# Patient Record
Sex: FEMALE | Race: WHITE | NOT HISPANIC OR LATINO | Employment: FULL TIME | URBAN - METROPOLITAN AREA
[De-identification: names, ages, dates, MRNs, and addresses within clinical notes are randomized per-mention and may not be internally consistent; named-entity substitution may affect disease eponyms.]

---

## 2017-08-29 ENCOUNTER — HOSPITAL ENCOUNTER (OUTPATIENT)
Facility: MEDICAL CENTER | Age: 27
End: 2017-08-29
Attending: OBSTETRICS & GYNECOLOGY
Payer: COMMERCIAL

## 2017-08-29 PROCEDURE — 87591 N.GONORRHOEAE DNA AMP PROB: CPT

## 2017-08-29 PROCEDURE — 88175 CYTOPATH C/V AUTO FLUID REDO: CPT

## 2017-08-29 PROCEDURE — 87491 CHLMYD TRACH DNA AMP PROBE: CPT

## 2017-08-30 LAB — CYTOLOGY REG CYTOL: NORMAL

## 2017-08-31 LAB
C TRACH DNA GENITAL QL NAA+PROBE: NEGATIVE
N GONORRHOEA DNA GENITAL QL NAA+PROBE: NEGATIVE
SPECIMEN SOURCE: NORMAL

## 2017-09-22 ENCOUNTER — HOSPITAL ENCOUNTER (OUTPATIENT)
Facility: MEDICAL CENTER | Age: 27
End: 2017-09-22
Attending: NURSE PRACTITIONER
Payer: COMMERCIAL

## 2017-09-22 ENCOUNTER — OFFICE VISIT (OUTPATIENT)
Dept: URGENT CARE | Facility: PHYSICIAN GROUP | Age: 27
End: 2017-09-22
Payer: COMMERCIAL

## 2017-09-22 VITALS
WEIGHT: 155 LBS | OXYGEN SATURATION: 97 % | SYSTOLIC BLOOD PRESSURE: 126 MMHG | BODY MASS INDEX: 27.46 KG/M2 | TEMPERATURE: 97.5 F | DIASTOLIC BLOOD PRESSURE: 80 MMHG | RESPIRATION RATE: 16 BRPM | HEIGHT: 63 IN | HEART RATE: 86 BPM

## 2017-09-22 DIAGNOSIS — R35.0 URINARY FREQUENCY: ICD-10-CM

## 2017-09-22 DIAGNOSIS — N39.0 URINARY TRACT INFECTION WITH HEMATURIA, SITE UNSPECIFIED: ICD-10-CM

## 2017-09-22 DIAGNOSIS — R31.9 URINARY TRACT INFECTION WITH HEMATURIA, SITE UNSPECIFIED: ICD-10-CM

## 2017-09-22 LAB
APPEARANCE UR: NORMAL
BILIRUB UR STRIP-MCNC: NEGATIVE MG/DL
COLOR UR AUTO: YELLOW
GLUCOSE UR STRIP.AUTO-MCNC: NEGATIVE MG/DL
INT CON NEG: NEGATIVE
INT CON POS: POSITIVE
KETONES UR STRIP.AUTO-MCNC: NEGATIVE MG/DL
LEUKOCYTE ESTERASE UR QL STRIP.AUTO: NORMAL
NITRITE UR QL STRIP.AUTO: NEGATIVE
PH UR STRIP.AUTO: 6 [PH] (ref 5–8)
POC URINE PREGNANCY TEST: NEGATIVE
PROT UR QL STRIP: NORMAL MG/DL
RBC UR QL AUTO: NORMAL
SP GR UR STRIP.AUTO: 1.01
UROBILINOGEN UR STRIP-MCNC: NEGATIVE MG/DL

## 2017-09-22 PROCEDURE — 81025 URINE PREGNANCY TEST: CPT | Performed by: NURSE PRACTITIONER

## 2017-09-22 PROCEDURE — 99000 SPECIMEN HANDLING OFFICE-LAB: CPT | Performed by: NURSE PRACTITIONER

## 2017-09-22 PROCEDURE — 87086 URINE CULTURE/COLONY COUNT: CPT

## 2017-09-22 PROCEDURE — 87077 CULTURE AEROBIC IDENTIFY: CPT

## 2017-09-22 PROCEDURE — 99204 OFFICE O/P NEW MOD 45 MIN: CPT | Performed by: NURSE PRACTITIONER

## 2017-09-22 PROCEDURE — 87186 SC STD MICRODIL/AGAR DIL: CPT

## 2017-09-22 PROCEDURE — 81002 URINALYSIS NONAUTO W/O SCOPE: CPT | Performed by: NURSE PRACTITIONER

## 2017-09-22 RX ORDER — MEDROXYPROGESTERONE ACETATE 150 MG/ML
150 INJECTION, SUSPENSION INTRAMUSCULAR ONCE
Status: ON HOLD | COMMUNITY
End: 2023-12-06

## 2017-09-22 RX ORDER — NITROFURANTOIN 25; 75 MG/1; MG/1
100 CAPSULE ORAL EVERY 12 HOURS
Qty: 14 CAP | Refills: 0 | Status: SHIPPED | OUTPATIENT
Start: 2017-09-22 | End: 2017-09-29

## 2017-09-22 ASSESSMENT — ENCOUNTER SYMPTOMS
FEVER: 0
NAUSEA: 0
WEAKNESS: 0
HEADACHES: 0
MYALGIAS: 1
DIARRHEA: 0
BACK PAIN: 1
VOMITING: 0
CHILLS: 0
DIZZINESS: 0
FLANK PAIN: 0
CONSTIPATION: 0
ABDOMINAL PAIN: 0

## 2017-09-22 NOTE — PROGRESS NOTES
"Subjective:      Thong Soto is a 27 y.o. female who presents with Urinary Frequency (Low back pain X  4days )            HPI  Thong is a female who is here for urinary frequency x 4days. States has had bilat low back pain without efver, n/v, low pelvic pressure, vaginal irritation, diarrhea, vaginal d/c. Has IUD. No recent vaginal spotting.    PMH:  has no past medical history on file.  MEDS:   Current Outpatient Prescriptions:   •  medroxyPROGESTERone (DEPO-PROVERA) 150 MG/ML Suspension, 150 mg by Intramuscular route Once., Disp: , Rfl:   •  nitrofurantoin monohydr macro (MACROBID) 100 MG Cap, Take 1 Cap by mouth every 12 hours for 7 days., Disp: 14 Cap, Rfl: 0  •  Pseudoeph-Doxylamine-DM-APAP (NYQUIL PO), Take  by mouth., Disp: , Rfl:   •  Pseudoephedrine-APAP-DM (DAYQUIL PO), Take  by mouth., Disp: , Rfl:   ALLERGIES: No Known Allergies  SURGHX: No past surgical history on file.  SOCHX:  reports that she has never smoked. She has never used smokeless tobacco. She reports that she drinks alcohol. She reports that she does not use drugs.  FH: Family history was reviewed, no pertinent findings to report    Review of Systems   Constitutional: Negative for chills, fever and malaise/fatigue.   Gastrointestinal: Negative for abdominal pain, constipation, diarrhea, nausea and vomiting.   Genitourinary: Positive for frequency and urgency. Negative for dysuria, flank pain and hematuria.   Musculoskeletal: Positive for back pain and myalgias.   Neurological: Negative for dizziness, weakness and headaches.   All other systems reviewed and are negative.         Objective:     /80   Pulse 86   Temp 36.4 °C (97.5 °F)   Resp 16   Ht 1.6 m (5' 3\")   Wt 70.3 kg (155 lb)   SpO2 97%   BMI 27.46 kg/m²      Physical Exam   Constitutional: She is oriented to person, place, and time. Vital signs are normal. She appears well-developed and well-nourished. She is active and cooperative.  Non-toxic appearance. She does not " have a sickly appearance. She does not appear ill. No distress.   HENT:   Head: Normocephalic.   Eyes: Conjunctivae and EOM are normal. Pupils are equal, round, and reactive to light.   Neck: Normal range of motion. Neck supple.   Cardiovascular: Normal rate and regular rhythm.    Pulmonary/Chest: Effort normal and breath sounds normal.   Abdominal: Soft. Bowel sounds are normal. She exhibits no distension. There is no tenderness. There is no rigidity, no rebound, no guarding and no CVA tenderness.   Neurological: She is alert and oriented to person, place, and time.   Skin: Skin is warm and dry. She is not diaphoretic.   Vitals reviewed.              Assessment/Plan:     1. Urinary frequency    - POCT PREGNANCY: NEG  - POCT Urinalysis    2. Urinary tract infection with hematuria, site unspecified    - nitrofurantoin monohydr macro (MACROBID) 100 MG Cap; Take 1 Cap by mouth every 12 hours for 7 days.  Dispense: 14 Cap; Refill: 0    Increase water intake  Urinate more frequently and empty bladder completely  Practice good toileting hygiene after bowel movements and sexual intercourse, refrain from sexual intercourse until infection cleared  Monitor for back/flank pain, difficulty urinating, blood in urine- need re-evaluation

## 2017-09-24 LAB
BACTERIA UR CULT: ABNORMAL
SIGNIFICANT IND 70042: ABNORMAL
SITE SITE: ABNORMAL
SOURCE SOURCE: ABNORMAL

## 2021-07-13 ENCOUNTER — OFFICE VISIT (OUTPATIENT)
Dept: URGENT CARE | Facility: PHYSICIAN GROUP | Age: 31
End: 2021-07-13
Payer: COMMERCIAL

## 2021-07-13 VITALS
OXYGEN SATURATION: 99 % | TEMPERATURE: 98.5 F | BODY MASS INDEX: 26.58 KG/M2 | SYSTOLIC BLOOD PRESSURE: 100 MMHG | HEIGHT: 63 IN | HEART RATE: 77 BPM | DIASTOLIC BLOOD PRESSURE: 62 MMHG | WEIGHT: 150 LBS

## 2021-07-13 DIAGNOSIS — H60.503 ACUTE OTITIS EXTERNA OF BOTH EARS, UNSPECIFIED TYPE: ICD-10-CM

## 2021-07-13 DIAGNOSIS — H93.8X3 IRRITATION OF EAR, BILATERAL: ICD-10-CM

## 2021-07-13 PROCEDURE — 99202 OFFICE O/P NEW SF 15 MIN: CPT | Performed by: NURSE PRACTITIONER

## 2021-07-13 NOTE — PROGRESS NOTES
Subjective:      Thong Soto is a 31 y.o. female who presents with Otalgia (bnoth ears are painful, itchty, crusty comes out, has fluid coming out , x 3 weeks)            Otalgia   There is pain in both ears. This is a new problem. Episode onset: pt reports new onset of bilateral ear itching, pressure, drainage that started 1-2 months ago. Reports crusty drainage to both ears in the morning. Notes some moisture in ear canals. The problem has been unchanged. Associated symptoms include ear discharge. She has tried nothing for the symptoms. There is no history of a chronic ear infection.       Review of Systems   HENT: Positive for ear discharge and ear pain.    All other systems reviewed and are negative.    History reviewed. No pertinent past medical history. History reviewed. No pertinent surgical history.   Social History     Socioeconomic History   • Marital status: Single     Spouse name: Not on file   • Number of children: Not on file   • Years of education: Not on file   • Highest education level: Not on file   Occupational History   • Not on file   Tobacco Use   • Smoking status: Never Smoker   • Smokeless tobacco: Never Used   Substance and Sexual Activity   • Alcohol use: Yes     Comment: occ   • Drug use: No   • Sexual activity: Yes     Partners: Male   Other Topics Concern   • Not on file   Social History Narrative   • Not on file     Social Determinants of Health     Financial Resource Strain:    • Difficulty of Paying Living Expenses:    Food Insecurity:    • Worried About Running Out of Food in the Last Year:    • Ran Out of Food in the Last Year:    Transportation Needs:    • Lack of Transportation (Medical):    • Lack of Transportation (Non-Medical):    Physical Activity:    • Days of Exercise per Week:    • Minutes of Exercise per Session:    Stress:    • Feeling of Stress :    Social Connections:    • Frequency of Communication with Friends and Family:    • Frequency of Social Gatherings with  "Friends and Family:    • Attends Orthodoxy Services:    • Active Member of Clubs or Organizations:    • Attends Club or Organization Meetings:    • Marital Status:    Intimate Partner Violence:    • Fear of Current or Ex-Partner:    • Emotionally Abused:    • Physically Abused:    • Sexually Abused:           Objective:     /62 (BP Location: Right arm, Patient Position: Sitting, BP Cuff Size: Adult)   Pulse 77   Temp 36.9 °C (98.5 °F)   Ht 1.6 m (5' 3\")   Wt 68 kg (150 lb)   SpO2 99%   BMI 26.57 kg/m²      Physical Exam  Vitals and nursing note reviewed.   Constitutional:       Appearance: Normal appearance. She is normal weight.   HENT:      Head: Normocephalic and atraumatic.      Right Ear: Tympanic membrane normal. Tenderness (tragus) present. No swelling.      Left Ear: Tympanic membrane normal. Tenderness (tragus) present. No swelling.      Ears:        Nose: Nose normal.      Mouth/Throat:      Mouth: Mucous membranes are moist.      Pharynx: Oropharynx is clear.   Eyes:      Extraocular Movements: Extraocular movements intact.      Pupils: Pupils are equal, round, and reactive to light.   Cardiovascular:      Rate and Rhythm: Normal rate and regular rhythm.   Pulmonary:      Effort: Pulmonary effort is normal.   Musculoskeletal:         General: Normal range of motion.      Cervical back: Normal range of motion.   Skin:     General: Skin is warm and dry.      Capillary Refill: Capillary refill takes less than 2 seconds.   Neurological:      General: No focal deficit present.      Mental Status: She is alert and oriented to person, place, and time. Mental status is at baseline.   Psychiatric:         Mood and Affect: Mood normal.         Speech: Speech normal.         Thought Content: Thought content normal.         Judgment: Judgment normal.                        Assessment/Plan:        1. Irritation of ear, bilateral    2. Acute otitis externa of both ears, unspecified type  - neomycin " sulf/polymyx B sulf/HC soln (CORTISPORIN HC SOL) [id number] Solution; Administer 3 Drops into affected ear(s) 4 times a day for 7 days.  Dispense: 10 mL; Refill: 0    Discussed with patient to keep her ear canal clean and dry as best as possible  Use otic drops as directed  Use hair dryer on low setting to help dry ear canal  Baby soap and soft wash cloth to clean away any debris from ear canal  May consider ENT referral if it does not improve  Supportive care, differential diagnoses, and indications for immediate follow-up discussed with patient.    Pathogenesis of diagnosis discussed including typical length and natural progression.      Instructed to return to  or nearest emergency department if symptoms fail to improve, for any change in condition, further concerns, or new concerning symptoms.  Patient states understanding of the plan of care and discharge instructions.

## 2023-05-05 ENCOUNTER — HOSPITAL ENCOUNTER (OUTPATIENT)
Facility: MEDICAL CENTER | Age: 33
End: 2023-05-05
Attending: OBSTETRICS & GYNECOLOGY
Payer: COMMERCIAL

## 2023-05-05 LAB
APPEARANCE UR: CLEAR
BILIRUB UR QL STRIP.AUTO: NEGATIVE
COLOR UR: YELLOW
GLUCOSE UR STRIP.AUTO-MCNC: NEGATIVE MG/DL
KETONES UR STRIP.AUTO-MCNC: NEGATIVE MG/DL
LEUKOCYTE ESTERASE UR QL STRIP.AUTO: NEGATIVE
MICRO URNS: NORMAL
NITRITE UR QL STRIP.AUTO: NEGATIVE
PH UR STRIP.AUTO: 6.5 [PH] (ref 5–8)
PROT UR QL STRIP: NEGATIVE MG/DL
RBC UR QL AUTO: NEGATIVE
SP GR UR STRIP.AUTO: 1
UROBILINOGEN UR STRIP.AUTO-MCNC: 0.2 MG/DL

## 2023-05-05 PROCEDURE — 81003 URINALYSIS AUTO W/O SCOPE: CPT

## 2023-05-05 PROCEDURE — 87389 HIV-1 AG W/HIV-1&-2 AB AG IA: CPT

## 2023-05-05 PROCEDURE — 86780 TREPONEMA PALLIDUM: CPT

## 2023-05-05 PROCEDURE — 87340 HEPATITIS B SURFACE AG IA: CPT

## 2023-05-05 PROCEDURE — 86803 HEPATITIS C AB TEST: CPT

## 2023-05-05 PROCEDURE — 87086 URINE CULTURE/COLONY COUNT: CPT

## 2023-05-05 PROCEDURE — 86901 BLOOD TYPING SEROLOGIC RH(D): CPT

## 2023-05-05 PROCEDURE — 86850 RBC ANTIBODY SCREEN: CPT

## 2023-05-05 PROCEDURE — 87491 CHLMYD TRACH DNA AMP PROBE: CPT

## 2023-05-05 PROCEDURE — 86900 BLOOD TYPING SEROLOGIC ABO: CPT

## 2023-05-05 PROCEDURE — 85025 COMPLETE CBC W/AUTO DIFF WBC: CPT

## 2023-05-05 PROCEDURE — 87591 N.GONORRHOEAE DNA AMP PROB: CPT

## 2023-05-05 PROCEDURE — 86762 RUBELLA ANTIBODY: CPT

## 2023-05-06 LAB
ABO GROUP BLD: NORMAL
BASOPHILS # BLD AUTO: 0.5 % (ref 0–1.8)
BASOPHILS # BLD: 0.05 K/UL (ref 0–0.12)
BLD GP AB SCN SERPL QL: NORMAL
C TRACH DNA SPEC QL NAA+PROBE: NEGATIVE
EOSINOPHIL # BLD AUTO: 0.03 K/UL (ref 0–0.51)
EOSINOPHIL NFR BLD: 0.3 % (ref 0–6.9)
ERYTHROCYTE [DISTWIDTH] IN BLOOD BY AUTOMATED COUNT: 41.6 FL (ref 35.9–50)
HBV SURFACE AG SER QL: ABNORMAL
HCT VFR BLD AUTO: 46.4 % (ref 37–47)
HCV AB SER QL: NORMAL
HGB BLD-MCNC: 15.6 G/DL (ref 12–16)
HIV 1+2 AB+HIV1 P24 AG SERPL QL IA: NORMAL
IMM GRANULOCYTES # BLD AUTO: 0.04 K/UL (ref 0–0.11)
IMM GRANULOCYTES NFR BLD AUTO: 0.4 % (ref 0–0.9)
LYMPHOCYTES # BLD AUTO: 1.4 K/UL (ref 1–4.8)
LYMPHOCYTES NFR BLD: 13.6 % (ref 22–41)
MCH RBC QN AUTO: 31.5 PG (ref 27–33)
MCHC RBC AUTO-ENTMCNC: 33.6 G/DL (ref 33.6–35)
MCV RBC AUTO: 93.7 FL (ref 81.4–97.8)
MONOCYTES # BLD AUTO: 0.5 K/UL (ref 0–0.85)
MONOCYTES NFR BLD AUTO: 4.9 % (ref 0–13.4)
N GONORRHOEA DNA SPEC QL NAA+PROBE: NEGATIVE
NEUTROPHILS # BLD AUTO: 8.28 K/UL (ref 2–7.15)
NEUTROPHILS NFR BLD: 80.3 % (ref 44–72)
NRBC # BLD AUTO: 0 K/UL
NRBC BLD-RTO: 0 /100 WBC
PLATELET # BLD AUTO: 211 K/UL (ref 164–446)
PMV BLD AUTO: 12.7 FL (ref 9–12.9)
RBC # BLD AUTO: 4.95 M/UL (ref 4.2–5.4)
RH BLD: NORMAL
RUBV AB SER QL: 9.15 IU/ML
SPECIMEN SOURCE: NORMAL
T PALLIDUM AB SER QL IA: ABNORMAL
WBC # BLD AUTO: 10.3 K/UL (ref 4.8–10.8)

## 2023-05-09 LAB
BACTERIA UR CULT: NORMAL
SIGNIFICANT IND 70042: NORMAL
SITE SITE: NORMAL
SOURCE SOURCE: NORMAL

## 2023-07-11 ENCOUNTER — OFFICE VISIT (OUTPATIENT)
Dept: OBGYN | Facility: CLINIC | Age: 33
End: 2023-07-11
Payer: COMMERCIAL

## 2023-07-11 VITALS — SYSTOLIC BLOOD PRESSURE: 130 MMHG | WEIGHT: 172.3 LBS | DIASTOLIC BLOOD PRESSURE: 71 MMHG | BODY MASS INDEX: 30.52 KG/M2

## 2023-07-11 DIAGNOSIS — Z36.3 ANTENATAL SCREENING FOR MALFORMATION USING ULTRASONICS: ICD-10-CM

## 2023-07-11 PROCEDURE — 3075F SYST BP GE 130 - 139MM HG: CPT | Performed by: OBSTETRICS & GYNECOLOGY

## 2023-07-11 PROCEDURE — 3078F DIAST BP <80 MM HG: CPT | Performed by: OBSTETRICS & GYNECOLOGY

## 2023-07-11 PROCEDURE — 76805 OB US >/= 14 WKS SNGL FETUS: CPT | Performed by: OBSTETRICS & GYNECOLOGY

## 2023-11-27 ENCOUNTER — HOSPITAL ENCOUNTER (EMERGENCY)
Facility: MEDICAL CENTER | Age: 33
End: 2023-11-27
Attending: OBSTETRICS & GYNECOLOGY | Admitting: OBSTETRICS & GYNECOLOGY
Payer: COMMERCIAL

## 2023-11-27 VITALS
OXYGEN SATURATION: 98 % | SYSTOLIC BLOOD PRESSURE: 139 MMHG | HEART RATE: 88 BPM | HEIGHT: 63 IN | DIASTOLIC BLOOD PRESSURE: 79 MMHG | WEIGHT: 193 LBS | TEMPERATURE: 97 F | BODY MASS INDEX: 34.2 KG/M2

## 2023-11-27 LAB
ALBUMIN SERPL BCP-MCNC: 3.2 G/DL (ref 3.2–4.9)
ALBUMIN/GLOB SERPL: 1 G/DL
ALP SERPL-CCNC: 128 U/L (ref 30–99)
ALT SERPL-CCNC: 11 U/L (ref 2–50)
ANION GAP SERPL CALC-SCNC: 13 MMOL/L (ref 7–16)
AST SERPL-CCNC: 12 U/L (ref 12–45)
BASOPHILS # BLD AUTO: 0.4 % (ref 0–1.8)
BASOPHILS # BLD: 0.04 K/UL (ref 0–0.12)
BILIRUB SERPL-MCNC: 0.3 MG/DL (ref 0.1–1.5)
BUN SERPL-MCNC: 8 MG/DL (ref 8–22)
CALCIUM ALBUM COR SERPL-MCNC: 9.7 MG/DL (ref 8.5–10.5)
CALCIUM SERPL-MCNC: 9.1 MG/DL (ref 8.5–10.5)
CHLORIDE SERPL-SCNC: 105 MMOL/L (ref 96–112)
CO2 SERPL-SCNC: 19 MMOL/L (ref 20–33)
CREAT SERPL-MCNC: 0.75 MG/DL (ref 0.5–1.4)
CREAT UR-MCNC: 32.14 MG/DL
CREAT UR-MCNC: 32.63 MG/DL
EOSINOPHIL # BLD AUTO: 0.02 K/UL (ref 0–0.51)
EOSINOPHIL NFR BLD: 0.2 % (ref 0–6.9)
ERYTHROCYTE [DISTWIDTH] IN BLOOD BY AUTOMATED COUNT: 40.5 FL (ref 35.9–50)
GFR SERPLBLD CREATININE-BSD FMLA CKD-EPI: 107 ML/MIN/1.73 M 2
GLOBULIN SER CALC-MCNC: 3.3 G/DL (ref 1.9–3.5)
GLUCOSE SERPL-MCNC: 67 MG/DL (ref 65–99)
HCT VFR BLD AUTO: 37.7 % (ref 37–47)
HGB BLD-MCNC: 13 G/DL (ref 12–16)
IMM GRANULOCYTES # BLD AUTO: 0.06 K/UL (ref 0–0.11)
IMM GRANULOCYTES NFR BLD AUTO: 0.6 % (ref 0–0.9)
LYMPHOCYTES # BLD AUTO: 1.49 K/UL (ref 1–4.8)
LYMPHOCYTES NFR BLD: 13.8 % (ref 22–41)
MCH RBC QN AUTO: 31.2 PG (ref 27–33)
MCHC RBC AUTO-ENTMCNC: 34.5 G/DL (ref 32.2–35.5)
MCV RBC AUTO: 90.4 FL (ref 81.4–97.8)
MONOCYTES # BLD AUTO: 0.59 K/UL (ref 0–0.85)
MONOCYTES NFR BLD AUTO: 5.5 % (ref 0–13.4)
NEUTROPHILS # BLD AUTO: 8.56 K/UL (ref 1.82–7.42)
NEUTROPHILS NFR BLD: 79.5 % (ref 44–72)
NRBC # BLD AUTO: 0 K/UL
NRBC BLD-RTO: 0 /100 WBC (ref 0–0.2)
PLATELET # BLD AUTO: 132 K/UL (ref 164–446)
PMV BLD AUTO: 13.4 FL (ref 9–12.9)
POTASSIUM SERPL-SCNC: 3.8 MMOL/L (ref 3.6–5.5)
PROT SERPL-MCNC: 6.5 G/DL (ref 6–8.2)
PROT UR-MCNC: 4 MG/DL (ref 0–15)
PROT UR-MCNC: 7 MG/DL (ref 0–15)
PROT/CREAT UR: 124 MG/G (ref 10–107)
PROT/CREAT UR: 215 MG/G (ref 10–107)
RBC # BLD AUTO: 4.17 M/UL (ref 4.2–5.4)
SODIUM SERPL-SCNC: 137 MMOL/L (ref 135–145)
URATE SERPL-MCNC: 5.7 MG/DL (ref 1.9–8.2)
WBC # BLD AUTO: 10.8 K/UL (ref 4.8–10.8)

## 2023-11-27 PROCEDURE — 36415 COLL VENOUS BLD VENIPUNCTURE: CPT

## 2023-11-27 PROCEDURE — 85025 COMPLETE CBC W/AUTO DIFF WBC: CPT

## 2023-11-27 PROCEDURE — 59025 FETAL NON-STRESS TEST: CPT

## 2023-11-27 PROCEDURE — 84156 ASSAY OF PROTEIN URINE: CPT

## 2023-11-27 PROCEDURE — 84550 ASSAY OF BLOOD/URIC ACID: CPT

## 2023-11-27 PROCEDURE — 82570 ASSAY OF URINE CREATININE: CPT | Mod: 91

## 2023-11-27 PROCEDURE — 80053 COMPREHEN METABOLIC PANEL: CPT

## 2023-11-27 PROCEDURE — 302449 STATCHG TRIAGE ONLY (STATISTIC)

## 2023-11-27 NOTE — PROGRESS NOTES
33 y.o  presenting for PIH work-up from [facility] office. States mild cramping sometimes, +FM, denies VB and LOF.     1345 EFM and TOCO placed. VSS.

## 2023-11-28 NOTE — PROGRESS NOTES
EDC -  EGA - 39-4    1320 - Pt arrived to labor and delivery for high BPs at home. Pt placed in room LDA5.  1345 - External monitors in place X2. Category I FHT at this time. VSS. Pt reports good FM. No complaints of contractions, ROM or vaginal bleeding. FOB at bedside. POC discussed with pt and family members, all questions answered. Lab orders placed.   - Dr Aleman updated on pt status including delay in results. Orders for SVE.   1800 - Dr Aleman updated on pt results, BPs, and SVE. DC orders received.    - DC instructions reviewed with pt and FOB. Instructed to come back with s/s of pre-e, all included in DC paperwork, labor DC instructions reviewed along with kick count paperwork. Pt to keep appointments with Dr Aleman this week. Pt and FOB ambulated off unit in stable condition with belongings in hand.

## 2023-12-02 ENCOUNTER — APPOINTMENT (OUTPATIENT)
Dept: OBGYN | Facility: MEDICAL CENTER | Age: 33
End: 2023-12-02
Attending: OBSTETRICS & GYNECOLOGY
Payer: COMMERCIAL

## 2023-12-02 ENCOUNTER — ANESTHESIA EVENT (OUTPATIENT)
Dept: OBGYN | Facility: MEDICAL CENTER | Age: 33
End: 2023-12-02
Payer: COMMERCIAL

## 2023-12-02 ENCOUNTER — HOSPITAL ENCOUNTER (INPATIENT)
Facility: MEDICAL CENTER | Age: 33
LOS: 4 days | End: 2023-12-06
Attending: OBSTETRICS & GYNECOLOGY | Admitting: OBSTETRICS & GYNECOLOGY
Payer: COMMERCIAL

## 2023-12-02 ENCOUNTER — ANESTHESIA (OUTPATIENT)
Dept: OBGYN | Facility: MEDICAL CENTER | Age: 33
End: 2023-12-02
Payer: COMMERCIAL

## 2023-12-02 DIAGNOSIS — G89.18 POSTOPERATIVE PAIN: ICD-10-CM

## 2023-12-02 LAB
BASOPHILS # BLD AUTO: 0.4 % (ref 0–1.8)
BASOPHILS # BLD: 0.04 K/UL (ref 0–0.12)
EOSINOPHIL # BLD AUTO: 0.03 K/UL (ref 0–0.51)
EOSINOPHIL NFR BLD: 0.3 % (ref 0–6.9)
ERYTHROCYTE [DISTWIDTH] IN BLOOD BY AUTOMATED COUNT: 41.1 FL (ref 35.9–50)
HCT VFR BLD AUTO: 36.2 % (ref 37–47)
HGB BLD-MCNC: 12.8 G/DL (ref 12–16)
HOLDING TUBE BB 8507: NORMAL
IMM GRANULOCYTES # BLD AUTO: 0.07 K/UL (ref 0–0.11)
IMM GRANULOCYTES NFR BLD AUTO: 0.7 % (ref 0–0.9)
LYMPHOCYTES # BLD AUTO: 1.4 K/UL (ref 1–4.8)
LYMPHOCYTES NFR BLD: 13.1 % (ref 22–41)
MCH RBC QN AUTO: 31.5 PG (ref 27–33)
MCHC RBC AUTO-ENTMCNC: 35.4 G/DL (ref 32.2–35.5)
MCV RBC AUTO: 89.2 FL (ref 81.4–97.8)
MONOCYTES # BLD AUTO: 0.49 K/UL (ref 0–0.85)
MONOCYTES NFR BLD AUTO: 4.6 % (ref 0–13.4)
NEUTROPHILS # BLD AUTO: 8.63 K/UL (ref 1.82–7.42)
NEUTROPHILS NFR BLD: 80.9 % (ref 44–72)
NRBC # BLD AUTO: 0 K/UL
NRBC BLD-RTO: 0 /100 WBC (ref 0–0.2)
PLATELET # BLD AUTO: 135 K/UL (ref 164–446)
PMV BLD AUTO: 13.2 FL (ref 9–12.9)
RBC # BLD AUTO: 4.06 M/UL (ref 4.2–5.4)
T PALLIDUM AB SER QL IA: NORMAL
WBC # BLD AUTO: 10.7 K/UL (ref 4.8–10.8)

## 2023-12-02 PROCEDURE — 85025 COMPLETE CBC W/AUTO DIFF WBC: CPT

## 2023-12-02 PROCEDURE — 303615 HCHG EPIDURAL/SPINAL ANESTHESIA FOR LABOR

## 2023-12-02 PROCEDURE — 700111 HCHG RX REV CODE 636 W/ 250 OVERRIDE (IP): Mod: JZ | Performed by: OBSTETRICS & GYNECOLOGY

## 2023-12-02 PROCEDURE — 700111 HCHG RX REV CODE 636 W/ 250 OVERRIDE (IP): Mod: JZ | Performed by: ANESTHESIOLOGY

## 2023-12-02 PROCEDURE — 770002 HCHG ROOM/CARE - OB PRIVATE (112)

## 2023-12-02 PROCEDURE — 700105 HCHG RX REV CODE 258: Performed by: ANESTHESIOLOGY

## 2023-12-02 PROCEDURE — 700105 HCHG RX REV CODE 258: Performed by: OBSTETRICS & GYNECOLOGY

## 2023-12-02 PROCEDURE — 700111 HCHG RX REV CODE 636 W/ 250 OVERRIDE (IP)

## 2023-12-02 PROCEDURE — 700101 HCHG RX REV CODE 250: Performed by: OBSTETRICS & GYNECOLOGY

## 2023-12-02 PROCEDURE — 36415 COLL VENOUS BLD VENIPUNCTURE: CPT

## 2023-12-02 PROCEDURE — 86780 TREPONEMA PALLIDUM: CPT

## 2023-12-02 RX ORDER — LIDOCAINE HYDROCHLORIDE 10 MG/ML
20 INJECTION, SOLUTION INFILTRATION; PERINEURAL
Status: DISCONTINUED | OUTPATIENT
Start: 2023-12-02 | End: 2023-12-03 | Stop reason: HOSPADM

## 2023-12-02 RX ORDER — SODIUM CHLORIDE, SODIUM LACTATE, POTASSIUM CHLORIDE, CALCIUM CHLORIDE 600; 310; 30; 20 MG/100ML; MG/100ML; MG/100ML; MG/100ML
INJECTION, SOLUTION INTRAVENOUS CONTINUOUS
Status: DISCONTINUED | OUTPATIENT
Start: 2023-12-02 | End: 2023-12-05 | Stop reason: HOSPADM

## 2023-12-02 RX ORDER — BUPIVACAINE HYDROCHLORIDE 2.5 MG/ML
INJECTION, SOLUTION EPIDURAL; INFILTRATION; INTRACAUDAL PRN
Status: DISCONTINUED | OUTPATIENT
Start: 2023-12-02 | End: 2023-12-03 | Stop reason: SURG

## 2023-12-02 RX ORDER — OXYTOCIN 10 [USP'U]/ML
10 INJECTION, SOLUTION INTRAMUSCULAR; INTRAVENOUS
Status: DISCONTINUED | OUTPATIENT
Start: 2023-12-02 | End: 2023-12-03 | Stop reason: HOSPADM

## 2023-12-02 RX ORDER — IBUPROFEN 800 MG/1
800 TABLET ORAL
Status: DISCONTINUED | OUTPATIENT
Start: 2023-12-02 | End: 2023-12-03 | Stop reason: HOSPADM

## 2023-12-02 RX ORDER — BUPIVACAINE HYDROCHLORIDE 2.5 MG/ML
INJECTION, SOLUTION EPIDURAL; INFILTRATION; INTRACAUDAL
Status: COMPLETED
Start: 2023-12-02 | End: 2023-12-02

## 2023-12-02 RX ORDER — CARBOPROST TROMETHAMINE 250 UG/ML
250 INJECTION, SOLUTION INTRAMUSCULAR
Status: DISCONTINUED | OUTPATIENT
Start: 2023-12-02 | End: 2023-12-03 | Stop reason: HOSPADM

## 2023-12-02 RX ORDER — TERBUTALINE SULFATE 1 MG/ML
0.25 INJECTION, SOLUTION SUBCUTANEOUS
Status: DISCONTINUED | OUTPATIENT
Start: 2023-12-02 | End: 2023-12-03 | Stop reason: HOSPADM

## 2023-12-02 RX ORDER — ROPIVACAINE HYDROCHLORIDE 2 MG/ML
INJECTION, SOLUTION EPIDURAL; INFILTRATION; PERINEURAL
Status: COMPLETED
Start: 2023-12-02 | End: 2023-12-02

## 2023-12-02 RX ORDER — MISOPROSTOL 200 UG/1
800 TABLET ORAL
Status: DISCONTINUED | OUTPATIENT
Start: 2023-12-02 | End: 2023-12-03 | Stop reason: HOSPADM

## 2023-12-02 RX ORDER — ROPIVACAINE HYDROCHLORIDE 2 MG/ML
INJECTION, SOLUTION EPIDURAL; INFILTRATION
Status: DISCONTINUED | OUTPATIENT
Start: 2023-12-02 | End: 2023-12-03 | Stop reason: SURG

## 2023-12-02 RX ORDER — ACETAMINOPHEN 500 MG
1000 TABLET ORAL
Status: COMPLETED | OUTPATIENT
Start: 2023-12-02 | End: 2023-12-03

## 2023-12-02 RX ADMIN — SODIUM CHLORIDE, POTASSIUM CHLORIDE, SODIUM LACTATE AND CALCIUM CHLORIDE: 600; 310; 30; 20 INJECTION, SOLUTION INTRAVENOUS at 12:00

## 2023-12-02 RX ADMIN — FENTANYL CITRATE 100 MCG: 50 INJECTION, SOLUTION INTRAMUSCULAR; INTRAVENOUS at 21:03

## 2023-12-02 RX ADMIN — SODIUM CHLORIDE, SODIUM GLUCONATE, SODIUM ACETATE, POTASSIUM CHLORIDE AND MAGNESIUM CHLORIDE: 526; 502; 368; 37; 30 INJECTION, SOLUTION INTRAVENOUS at 20:50

## 2023-12-02 RX ADMIN — OXYTOCIN 2 MILLI-UNITS/MIN: 10 INJECTION, SOLUTION INTRAMUSCULAR; INTRAVENOUS at 14:10

## 2023-12-02 RX ADMIN — WATER 2.5 MILLION UNITS: 1 INJECTION INTRAMUSCULAR; INTRAVENOUS; SUBCUTANEOUS at 23:52

## 2023-12-02 RX ADMIN — WATER 2.5 MILLION UNITS: 1 INJECTION INTRAMUSCULAR; INTRAVENOUS; SUBCUTANEOUS at 19:44

## 2023-12-02 RX ADMIN — SODIUM CHLORIDE 5 MILLION UNITS: 900 INJECTION INTRAVENOUS at 12:02

## 2023-12-02 RX ADMIN — ROPIVACAINE HYDROCHLORIDE: 2 INJECTION, SOLUTION EPIDURAL; INFILTRATION at 21:12

## 2023-12-02 RX ADMIN — WATER 2.5 MILLION UNITS: 1 INJECTION INTRAMUSCULAR; INTRAVENOUS; SUBCUTANEOUS at 16:56

## 2023-12-02 RX ADMIN — BUPIVACAINE HYDROCHLORIDE 10 ML: 2.5 INJECTION, SOLUTION EPIDURAL; INFILTRATION; INTRACAUDAL at 21:03

## 2023-12-02 RX ADMIN — ROPIVACAINE HYDROCHLORIDE 10 ML/HR: 5 INJECTION, SOLUTION EPIDURAL; INFILTRATION; PERINEURAL at 21:12

## 2023-12-02 RX ADMIN — ROPIVACAINE HYDROCHLORIDE: 2 INJECTION, SOLUTION EPIDURAL; INFILTRATION; PERINEURAL at 21:12

## 2023-12-02 ASSESSMENT — LIFESTYLE VARIABLES: EVER_SMOKED: NEVER

## 2023-12-02 ASSESSMENT — PATIENT HEALTH QUESTIONNAIRE - PHQ9
SUM OF ALL RESPONSES TO PHQ9 QUESTIONS 1 AND 2: 0
1. LITTLE INTEREST OR PLEASURE IN DOING THINGS: NOT AT ALL
2. FEELING DOWN, DEPRESSED, IRRITABLE, OR HOPELESS: NOT AT ALL
SUM OF ALL RESPONSES TO PHQ9 QUESTIONS 1 AND 2: 0
1. LITTLE INTEREST OR PLEASURE IN DOING THINGS: NOT AT ALL
2. FEELING DOWN, DEPRESSED, IRRITABLE, OR HOPELESS: NOT AT ALL

## 2023-12-02 ASSESSMENT — FIBROSIS 4 INDEX: FIB4 SCORE: .9045340337332908679

## 2023-12-02 NOTE — PROGRESS NOTES
1400- Bedside report received from Bee AGUILAR., induction POC discussed, care assumed. Pt resting comfortably in bed with family at side, pt denies any needs. Call light within reach.     1500- Bedside report given to Olimpia GALVAN, induction POC discussed, care relinquished with pt in stable condition.

## 2023-12-02 NOTE — PROGRESS NOTES
Pt arrived to unit for scheduled IOL. Pt reports +fetal movement and occasional contractions. She denies LOF or vaginal bleeding. Options for induction discussed as well as likely course of care. Pain management options discussed. Pt encouraged to move frequently and communicate with RN for non-medical pain interventions.     1400 Report to JEFF Means. Care relinquished.

## 2023-12-03 LAB
ERYTHROCYTE [DISTWIDTH] IN BLOOD BY AUTOMATED COUNT: 41.5 FL (ref 35.9–50)
HCT VFR BLD AUTO: 31.7 % (ref 37–47)
HGB BLD-MCNC: 10.8 G/DL (ref 12–16)
MCH RBC QN AUTO: 31 PG (ref 27–33)
MCHC RBC AUTO-ENTMCNC: 34.1 G/DL (ref 32.2–35.5)
MCV RBC AUTO: 91.1 FL (ref 81.4–97.8)
PLATELET # BLD AUTO: 115 K/UL (ref 164–446)
PMV BLD AUTO: 13.5 FL (ref 9–12.9)
RBC # BLD AUTO: 3.48 M/UL (ref 4.2–5.4)
WBC # BLD AUTO: 18.1 K/UL (ref 4.8–10.8)

## 2023-12-03 PROCEDURE — 59514 CESAREAN DELIVERY ONLY: CPT | Mod: AS | Performed by: ADVANCED PRACTICE MIDWIFE

## 2023-12-03 PROCEDURE — 770002 HCHG ROOM/CARE - OB PRIVATE (112)

## 2023-12-03 PROCEDURE — 700111 HCHG RX REV CODE 636 W/ 250 OVERRIDE (IP): Performed by: ANESTHESIOLOGY

## 2023-12-03 PROCEDURE — 700102 HCHG RX REV CODE 250 W/ 637 OVERRIDE(OP): Performed by: OBSTETRICS & GYNECOLOGY

## 2023-12-03 PROCEDURE — 160048 HCHG OR STATISTICAL LEVEL 1-5: Performed by: OBSTETRICS & GYNECOLOGY

## 2023-12-03 PROCEDURE — 160035 HCHG PACU - 1ST 60 MINS PHASE I: Performed by: OBSTETRICS & GYNECOLOGY

## 2023-12-03 PROCEDURE — 160029 HCHG SURGERY MINUTES - 1ST 30 MINS LEVEL 4: Performed by: OBSTETRICS & GYNECOLOGY

## 2023-12-03 PROCEDURE — 700111 HCHG RX REV CODE 636 W/ 250 OVERRIDE (IP): Mod: JZ | Performed by: OBSTETRICS & GYNECOLOGY

## 2023-12-03 PROCEDURE — 36415 COLL VENOUS BLD VENIPUNCTURE: CPT

## 2023-12-03 PROCEDURE — 85027 COMPLETE CBC AUTOMATED: CPT

## 2023-12-03 PROCEDURE — 700105 HCHG RX REV CODE 258: Performed by: OBSTETRICS & GYNECOLOGY

## 2023-12-03 PROCEDURE — A9270 NON-COVERED ITEM OR SERVICE: HCPCS | Performed by: OBSTETRICS & GYNECOLOGY

## 2023-12-03 PROCEDURE — 160002 HCHG RECOVERY MINUTES (STAT): Performed by: OBSTETRICS & GYNECOLOGY

## 2023-12-03 PROCEDURE — 160009 HCHG ANES TIME/MIN: Performed by: OBSTETRICS & GYNECOLOGY

## 2023-12-03 PROCEDURE — 700101 HCHG RX REV CODE 250: Performed by: ANESTHESIOLOGY

## 2023-12-03 PROCEDURE — 160041 HCHG SURGERY MINUTES - EA ADDL 1 MIN LEVEL 4: Performed by: OBSTETRICS & GYNECOLOGY

## 2023-12-03 PROCEDURE — 700101 HCHG RX REV CODE 250: Performed by: OBSTETRICS & GYNECOLOGY

## 2023-12-03 PROCEDURE — C1755 CATHETER, INTRASPINAL: HCPCS | Performed by: OBSTETRICS & GYNECOLOGY

## 2023-12-03 RX ORDER — MIDAZOLAM HYDROCHLORIDE 1 MG/ML
1 INJECTION INTRAMUSCULAR; INTRAVENOUS
Status: DISCONTINUED | OUTPATIENT
Start: 2023-12-03 | End: 2023-12-03 | Stop reason: HOSPADM

## 2023-12-03 RX ORDER — CITRIC ACID/SODIUM CITRATE 334-500MG
30 SOLUTION, ORAL ORAL ONCE
Status: COMPLETED | OUTPATIENT
Start: 2023-12-03 | End: 2023-12-03

## 2023-12-03 RX ORDER — IBUPROFEN 800 MG/1
800 TABLET ORAL EVERY 8 HOURS
Status: DISCONTINUED | OUTPATIENT
Start: 2023-12-04 | End: 2023-12-06 | Stop reason: HOSPADM

## 2023-12-03 RX ORDER — OXYCODONE HYDROCHLORIDE 5 MG/1
5 TABLET ORAL EVERY 4 HOURS PRN
Status: ACTIVE | OUTPATIENT
Start: 2023-12-03 | End: 2023-12-04

## 2023-12-03 RX ORDER — SIMETHICONE 125 MG
125 TABLET,CHEWABLE ORAL 4 TIMES DAILY PRN
Status: DISCONTINUED | OUTPATIENT
Start: 2023-12-03 | End: 2023-12-06 | Stop reason: HOSPADM

## 2023-12-03 RX ORDER — ONDANSETRON 4 MG/1
4 TABLET, ORALLY DISINTEGRATING ORAL EVERY 6 HOURS PRN
Status: DISCONTINUED | OUTPATIENT
Start: 2023-12-03 | End: 2023-12-06 | Stop reason: HOSPADM

## 2023-12-03 RX ORDER — CEFAZOLIN SODIUM 1 G/3ML
INJECTION, POWDER, FOR SOLUTION INTRAMUSCULAR; INTRAVENOUS PRN
Status: DISCONTINUED | OUTPATIENT
Start: 2023-12-03 | End: 2023-12-03

## 2023-12-03 RX ORDER — HYDROMORPHONE HYDROCHLORIDE 1 MG/ML
0.4 INJECTION, SOLUTION INTRAMUSCULAR; INTRAVENOUS; SUBCUTANEOUS
Status: ACTIVE | OUTPATIENT
Start: 2023-12-03 | End: 2023-12-04

## 2023-12-03 RX ORDER — DIPHENHYDRAMINE HYDROCHLORIDE 50 MG/ML
12.5 INJECTION INTRAMUSCULAR; INTRAVENOUS EVERY 6 HOURS PRN
Status: ACTIVE | OUTPATIENT
Start: 2023-12-03 | End: 2023-12-04

## 2023-12-03 RX ORDER — CEFAZOLIN SODIUM 1 G/3ML
INJECTION, POWDER, FOR SOLUTION INTRAMUSCULAR; INTRAVENOUS PRN
Status: DISCONTINUED | OUTPATIENT
Start: 2023-12-03 | End: 2023-12-03 | Stop reason: SURG

## 2023-12-03 RX ORDER — KETOROLAC TROMETHAMINE 30 MG/ML
30 INJECTION, SOLUTION INTRAMUSCULAR; INTRAVENOUS EVERY 6 HOURS
Status: COMPLETED | OUTPATIENT
Start: 2023-12-03 | End: 2023-12-04

## 2023-12-03 RX ORDER — ACETAMINOPHEN 500 MG
1000 TABLET ORAL EVERY 6 HOURS PRN
Status: DISCONTINUED | OUTPATIENT
Start: 2023-12-06 | End: 2023-12-06 | Stop reason: HOSPADM

## 2023-12-03 RX ORDER — ONDANSETRON 2 MG/ML
INJECTION INTRAMUSCULAR; INTRAVENOUS
Status: DISPENSED
Start: 2023-12-03 | End: 2023-12-03

## 2023-12-03 RX ORDER — AZITHROMYCIN 500 MG/5ML
500 INJECTION, POWDER, LYOPHILIZED, FOR SOLUTION INTRAVENOUS ONCE
Status: COMPLETED | OUTPATIENT
Start: 2023-12-03 | End: 2023-12-03

## 2023-12-03 RX ORDER — PHYTONADIONE 2 MG/ML
INJECTION, EMULSION INTRAMUSCULAR; INTRAVENOUS; SUBCUTANEOUS
Status: ACTIVE
Start: 2023-12-03 | End: 2023-12-03

## 2023-12-03 RX ORDER — METHYLERGONOVINE MALEATE 0.2 MG/ML
INJECTION INTRAVENOUS
Status: COMPLETED
Start: 2023-12-03 | End: 2023-12-03

## 2023-12-03 RX ORDER — DIPHENHYDRAMINE HCL 25 MG
25 TABLET ORAL EVERY 6 HOURS PRN
Status: DISCONTINUED | OUTPATIENT
Start: 2023-12-03 | End: 2023-12-03

## 2023-12-03 RX ORDER — HALOPERIDOL 5 MG/ML
1 INJECTION INTRAMUSCULAR
Status: DISCONTINUED | OUTPATIENT
Start: 2023-12-03 | End: 2023-12-03 | Stop reason: HOSPADM

## 2023-12-03 RX ORDER — CALCIUM CARBONATE 500 MG/1
1000 TABLET, CHEWABLE ORAL EVERY 6 HOURS PRN
Status: DISCONTINUED | OUTPATIENT
Start: 2023-12-03 | End: 2023-12-06 | Stop reason: HOSPADM

## 2023-12-03 RX ORDER — METOCLOPRAMIDE HYDROCHLORIDE 5 MG/ML
10 INJECTION INTRAMUSCULAR; INTRAVENOUS ONCE
Status: COMPLETED | OUTPATIENT
Start: 2023-12-03 | End: 2023-12-03

## 2023-12-03 RX ORDER — ACETAMINOPHEN 500 MG
1000 TABLET ORAL EVERY 6 HOURS
Status: DISCONTINUED | OUTPATIENT
Start: 2023-12-03 | End: 2023-12-03

## 2023-12-03 RX ORDER — ROPIVACAINE HYDROCHLORIDE 2 MG/ML
INJECTION, SOLUTION EPIDURAL; INFILTRATION; PERINEURAL CONTINUOUS
Status: DISCONTINUED | OUTPATIENT
Start: 2023-12-03 | End: 2023-12-05 | Stop reason: HOSPADM

## 2023-12-03 RX ORDER — SODIUM CHLORIDE, SODIUM LACTATE, POTASSIUM CHLORIDE, AND CALCIUM CHLORIDE .6; .31; .03; .02 G/100ML; G/100ML; G/100ML; G/100ML
250 INJECTION, SOLUTION INTRAVENOUS PRN
Status: DISCONTINUED | OUTPATIENT
Start: 2023-12-03 | End: 2023-12-03 | Stop reason: HOSPADM

## 2023-12-03 RX ORDER — ERYTHROMYCIN 5 MG/G
OINTMENT OPHTHALMIC
Status: ACTIVE
Start: 2023-12-03 | End: 2023-12-03

## 2023-12-03 RX ORDER — OXYCODONE HCL 5 MG/5 ML
5 SOLUTION, ORAL ORAL
Status: DISCONTINUED | OUTPATIENT
Start: 2023-12-03 | End: 2023-12-03 | Stop reason: HOSPADM

## 2023-12-03 RX ORDER — IBUPROFEN 800 MG/1
800 TABLET ORAL EVERY 8 HOURS PRN
Status: DISCONTINUED | OUTPATIENT
Start: 2023-12-07 | End: 2023-12-06 | Stop reason: HOSPADM

## 2023-12-03 RX ORDER — ACETAMINOPHEN 500 MG
1000 TABLET ORAL EVERY 6 HOURS
Status: DISPENSED | OUTPATIENT
Start: 2023-12-03 | End: 2023-12-06

## 2023-12-03 RX ORDER — SODIUM CHLORIDE, SODIUM LACTATE, POTASSIUM CHLORIDE, AND CALCIUM CHLORIDE .6; .31; .03; .02 G/100ML; G/100ML; G/100ML; G/100ML
1000 INJECTION, SOLUTION INTRAVENOUS
Status: DISCONTINUED | OUTPATIENT
Start: 2023-12-03 | End: 2023-12-03 | Stop reason: HOSPADM

## 2023-12-03 RX ORDER — ONDANSETRON 2 MG/ML
4 INJECTION INTRAMUSCULAR; INTRAVENOUS EVERY 6 HOURS PRN
Status: ACTIVE | OUTPATIENT
Start: 2023-12-03 | End: 2023-12-04

## 2023-12-03 RX ORDER — ONDANSETRON 2 MG/ML
4 INJECTION INTRAMUSCULAR; INTRAVENOUS EVERY 6 HOURS PRN
Status: DISCONTINUED | OUTPATIENT
Start: 2023-12-03 | End: 2023-12-06 | Stop reason: HOSPADM

## 2023-12-03 RX ORDER — SODIUM CHLORIDE, SODIUM LACTATE, POTASSIUM CHLORIDE, AND CALCIUM CHLORIDE .6; .31; .03; .02 G/100ML; G/100ML; G/100ML; G/100ML
1000 INJECTION, SOLUTION INTRAVENOUS ONCE
Status: COMPLETED | OUTPATIENT
Start: 2023-12-03 | End: 2023-12-03

## 2023-12-03 RX ORDER — VITAMIN A ACETATE, BETA CAROTENE, ASCORBIC ACID, CHOLECALCIFEROL, .ALPHA.-TOCOPHEROL ACETATE, DL-, THIAMINE MONONITRATE, RIBOFLAVIN, NIACINAMIDE, PYRIDOXINE HYDROCHLORIDE, FOLIC ACID, CYANOCOBALAMIN, CALCIUM CARBONATE, FERROUS FUMARATE, ZINC OXIDE, CUPRIC OXIDE 3080; 12; 120; 400; 1; 1.84; 3; 20; 22; 920; 25; 200; 27; 10; 2 [IU]/1; UG/1; MG/1; [IU]/1; MG/1; MG/1; MG/1; MG/1; MG/1; [IU]/1; MG/1; MG/1; MG/1; MG/1; MG/1
1 TABLET, FILM COATED ORAL
Status: DISCONTINUED | OUTPATIENT
Start: 2023-12-03 | End: 2023-12-06 | Stop reason: HOSPADM

## 2023-12-03 RX ORDER — SODIUM CHLORIDE, SODIUM LACTATE, POTASSIUM CHLORIDE, CALCIUM CHLORIDE 600; 310; 30; 20 MG/100ML; MG/100ML; MG/100ML; MG/100ML
INJECTION, SOLUTION INTRAVENOUS PRN
Status: DISCONTINUED | OUTPATIENT
Start: 2023-12-03 | End: 2023-12-06 | Stop reason: HOSPADM

## 2023-12-03 RX ORDER — MORPHINE SULFATE 0.5 MG/ML
INJECTION, SOLUTION EPIDURAL; INTRATHECAL; INTRAVENOUS PRN
Status: DISCONTINUED | OUTPATIENT
Start: 2023-12-03 | End: 2023-12-03 | Stop reason: SURG

## 2023-12-03 RX ORDER — OXYCODONE HYDROCHLORIDE 10 MG/1
10 TABLET ORAL EVERY 4 HOURS PRN
Status: ACTIVE | OUTPATIENT
Start: 2023-12-03 | End: 2023-12-04

## 2023-12-03 RX ORDER — SODIUM CHLORIDE, SODIUM GLUCONATE, SODIUM ACETATE, POTASSIUM CHLORIDE AND MAGNESIUM CHLORIDE 526; 502; 368; 37; 30 MG/100ML; MG/100ML; MG/100ML; MG/100ML; MG/100ML
INJECTION, SOLUTION INTRAVENOUS
Status: DISCONTINUED | OUTPATIENT
Start: 2023-12-02 | End: 2023-12-03 | Stop reason: SURG

## 2023-12-03 RX ORDER — DIPHENHYDRAMINE HYDROCHLORIDE 50 MG/ML
12.5 INJECTION INTRAMUSCULAR; INTRAVENOUS
Status: DISCONTINUED | OUTPATIENT
Start: 2023-12-03 | End: 2023-12-03 | Stop reason: HOSPADM

## 2023-12-03 RX ORDER — SODIUM CHLORIDE, SODIUM GLUCONATE, SODIUM ACETATE, POTASSIUM CHLORIDE AND MAGNESIUM CHLORIDE 526; 502; 368; 37; 30 MG/100ML; MG/100ML; MG/100ML; MG/100ML; MG/100ML
1000 INJECTION, SOLUTION INTRAVENOUS ONCE
Status: COMPLETED | OUTPATIENT
Start: 2023-12-03 | End: 2023-12-03

## 2023-12-03 RX ORDER — PHENYLEPHRINE HCL IN 0.9% NACL 0.5 MG/5ML
SYRINGE (ML) INTRAVENOUS PRN
Status: DISCONTINUED | OUTPATIENT
Start: 2023-12-03 | End: 2023-12-03 | Stop reason: SURG

## 2023-12-03 RX ORDER — DOCUSATE SODIUM 100 MG/1
100 CAPSULE, LIQUID FILLED ORAL 2 TIMES DAILY PRN
Status: DISCONTINUED | OUTPATIENT
Start: 2023-12-03 | End: 2023-12-06 | Stop reason: HOSPADM

## 2023-12-03 RX ORDER — OXYTOCIN 10 [USP'U]/ML
INJECTION, SOLUTION INTRAMUSCULAR; INTRAVENOUS PRN
Status: DISCONTINUED | OUTPATIENT
Start: 2023-12-03 | End: 2023-12-03 | Stop reason: SURG

## 2023-12-03 RX ORDER — SODIUM CHLORIDE, SODIUM LACTATE, POTASSIUM CHLORIDE, CALCIUM CHLORIDE 600; 310; 30; 20 MG/100ML; MG/100ML; MG/100ML; MG/100ML
INJECTION, SOLUTION INTRAVENOUS CONTINUOUS
Status: CANCELLED | OUTPATIENT
Start: 2023-12-03

## 2023-12-03 RX ORDER — DIPHENHYDRAMINE HYDROCHLORIDE 50 MG/ML
25 INJECTION INTRAMUSCULAR; INTRAVENOUS EVERY 6 HOURS PRN
Status: DISCONTINUED | OUTPATIENT
Start: 2023-12-03 | End: 2023-12-03

## 2023-12-03 RX ORDER — BISACODYL 10 MG
10 SUPPOSITORY, RECTAL RECTAL PRN
Status: DISCONTINUED | OUTPATIENT
Start: 2023-12-03 | End: 2023-12-06 | Stop reason: HOSPADM

## 2023-12-03 RX ORDER — METHYLERGONOVINE MALEATE 0.2 MG/ML
INJECTION INTRAVENOUS PRN
Status: DISCONTINUED | OUTPATIENT
Start: 2023-12-03 | End: 2023-12-03 | Stop reason: SURG

## 2023-12-03 RX ORDER — HYDROMORPHONE HYDROCHLORIDE 1 MG/ML
0.2 INJECTION, SOLUTION INTRAMUSCULAR; INTRAVENOUS; SUBCUTANEOUS
Status: ACTIVE | OUTPATIENT
Start: 2023-12-03 | End: 2023-12-04

## 2023-12-03 RX ORDER — OXYCODONE HCL 5 MG/5 ML
10 SOLUTION, ORAL ORAL
Status: DISCONTINUED | OUTPATIENT
Start: 2023-12-03 | End: 2023-12-03 | Stop reason: HOSPADM

## 2023-12-03 RX ORDER — OXYCODONE HYDROCHLORIDE 10 MG/1
10 TABLET ORAL EVERY 4 HOURS PRN
Status: DISCONTINUED | OUTPATIENT
Start: 2023-12-03 | End: 2023-12-03

## 2023-12-03 RX ORDER — LIDOCAINE HCL/EPINEPHRINE/PF 2%-1:200K
VIAL (ML) INJECTION PRN
Status: DISCONTINUED | OUTPATIENT
Start: 2023-12-03 | End: 2023-12-03 | Stop reason: SURG

## 2023-12-03 RX ORDER — DIPHENHYDRAMINE HYDROCHLORIDE 50 MG/ML
25 INJECTION INTRAMUSCULAR; INTRAVENOUS EVERY 6 HOURS PRN
Status: ACTIVE | OUTPATIENT
Start: 2023-12-03 | End: 2023-12-04

## 2023-12-03 RX ORDER — MEPERIDINE HYDROCHLORIDE 25 MG/ML
12.5 INJECTION INTRAMUSCULAR; INTRAVENOUS; SUBCUTANEOUS
Status: DISCONTINUED | OUTPATIENT
Start: 2023-12-03 | End: 2023-12-03 | Stop reason: HOSPADM

## 2023-12-03 RX ORDER — ONDANSETRON 2 MG/ML
4 INJECTION INTRAMUSCULAR; INTRAVENOUS
Status: DISCONTINUED | OUTPATIENT
Start: 2023-12-03 | End: 2023-12-03 | Stop reason: HOSPADM

## 2023-12-03 RX ORDER — EPHEDRINE SULFATE 50 MG/ML
10 INJECTION, SOLUTION INTRAVENOUS
Status: ACTIVE | OUTPATIENT
Start: 2023-12-03 | End: 2023-12-04

## 2023-12-03 RX ORDER — OXYCODONE HYDROCHLORIDE 5 MG/1
5 TABLET ORAL EVERY 4 HOURS PRN
Status: DISCONTINUED | OUTPATIENT
Start: 2023-12-03 | End: 2023-12-03

## 2023-12-03 RX ORDER — OXYMETAZOLINE HYDROCHLORIDE 0.05 G/100ML
2 SPRAY NASAL 2 TIMES DAILY PRN
Status: DISPENSED | OUTPATIENT
Start: 2023-12-03 | End: 2023-12-06

## 2023-12-03 RX ORDER — EPHEDRINE SULFATE 50 MG/ML
5 INJECTION, SOLUTION INTRAVENOUS
Status: DISCONTINUED | OUTPATIENT
Start: 2023-12-03 | End: 2023-12-03 | Stop reason: HOSPADM

## 2023-12-03 RX ORDER — KETOROLAC TROMETHAMINE 30 MG/ML
30 INJECTION, SOLUTION INTRAMUSCULAR; INTRAVENOUS EVERY 6 HOURS
Status: DISCONTINUED | OUTPATIENT
Start: 2023-12-03 | End: 2023-12-03

## 2023-12-03 RX ADMIN — PRENATAL WITH FERROUS FUM AND FOLIC ACID 1 TABLET: 3080; 920; 120; 400; 22; 1.84; 3; 20; 10; 1; 12; 200; 27; 25; 2 TABLET ORAL at 08:05

## 2023-12-03 RX ADMIN — ACETAMINOPHEN 1000 MG: 500 TABLET ORAL at 04:41

## 2023-12-03 RX ADMIN — ACETAMINOPHEN 1000 MG: 500 TABLET, FILM COATED ORAL at 23:18

## 2023-12-03 RX ADMIN — MORPHINE SULFATE 1.5 MG: 0.5 INJECTION, SOLUTION EPIDURAL; INTRATHECAL; INTRAVENOUS at 03:52

## 2023-12-03 RX ADMIN — LIDOCAINE HYDROCHLORIDE,EPINEPHRINE BITARTRATE 15 ML: 20; .005 INJECTION, SOLUTION EPIDURAL; INFILTRATION; INTRACAUDAL; PERINEURAL at 03:36

## 2023-12-03 RX ADMIN — SODIUM CHLORIDE, SODIUM GLUCONATE, SODIUM ACETATE, POTASSIUM CHLORIDE AND MAGNESIUM CHLORIDE 1000 ML: 526; 502; 368; 37; 30 INJECTION, SOLUTION INTRAVENOUS at 03:26

## 2023-12-03 RX ADMIN — METOCLOPRAMIDE 10 MG: 5 INJECTION, SOLUTION INTRAMUSCULAR; INTRAVENOUS at 03:25

## 2023-12-03 RX ADMIN — Medication 100 MCG: at 04:00

## 2023-12-03 RX ADMIN — AZITHROMYCIN 500 MG: 500 INJECTION, POWDER, LYOPHILIZED, FOR SOLUTION INTRAVENOUS at 03:39

## 2023-12-03 RX ADMIN — OXYTOCIN 20 UNITS: 10 INJECTION, SOLUTION INTRAMUSCULAR; INTRAVENOUS at 03:44

## 2023-12-03 RX ADMIN — OXYTOCIN 125 ML/HR: 10 INJECTION, SOLUTION INTRAMUSCULAR; INTRAVENOUS at 06:11

## 2023-12-03 RX ADMIN — KETOROLAC TROMETHAMINE 30 MG: 30 INJECTION, SOLUTION INTRAMUSCULAR; INTRAVENOUS at 21:20

## 2023-12-03 RX ADMIN — Medication 100 MCG: at 04:09

## 2023-12-03 RX ADMIN — SODIUM CHLORIDE, POTASSIUM CHLORIDE, SODIUM LACTATE AND CALCIUM CHLORIDE 1000 ML: 600; 310; 30; 20 INJECTION, SOLUTION INTRAVENOUS at 04:44

## 2023-12-03 RX ADMIN — SODIUM CITRATE AND CITRIC ACID MONOHYDRATE 30 ML: 500; 334 SOLUTION ORAL at 04:41

## 2023-12-03 RX ADMIN — METHYLERGONOVINE MALEATE 200 MCG: 0.2 INJECTION, SOLUTION INTRAMUSCULAR; INTRAVENOUS at 04:05

## 2023-12-03 RX ADMIN — KETOROLAC TROMETHAMINE 30 MG: 30 INJECTION, SOLUTION INTRAMUSCULAR; INTRAVENOUS at 14:24

## 2023-12-03 RX ADMIN — ACETAMINOPHEN 1000 MG: 500 TABLET, FILM COATED ORAL at 11:25

## 2023-12-03 RX ADMIN — OXYTOCIN 10 UNITS: 10 INJECTION, SOLUTION INTRAMUSCULAR; INTRAVENOUS at 04:14

## 2023-12-03 RX ADMIN — CEFAZOLIN 3 G: 1 INJECTION, POWDER, FOR SOLUTION INTRAMUSCULAR; INTRAVENOUS at 03:43

## 2023-12-03 RX ADMIN — DOCUSATE SODIUM 100 MG: 100 CAPSULE, LIQUID FILLED ORAL at 08:05

## 2023-12-03 RX ADMIN — KETOROLAC TROMETHAMINE 30 MG: 30 INJECTION, SOLUTION INTRAMUSCULAR; INTRAVENOUS at 08:05

## 2023-12-03 RX ADMIN — ACETAMINOPHEN 1000 MG: 500 TABLET, FILM COATED ORAL at 17:19

## 2023-12-03 ASSESSMENT — PAIN DESCRIPTION - PAIN TYPE
TYPE: ACUTE PAIN
TYPE: ACUTE PAIN;SURGICAL PAIN
TYPE: ACUTE PAIN

## 2023-12-03 ASSESSMENT — PAIN SCALES - GENERAL: PAIN_LEVEL: 0

## 2023-12-03 NOTE — H&P
"History and Physical      Thong Soto is a 33 y.o. female  at 40w2d who presents for IOL secondary to postdates and labile BP. Pt has been having CTXs but no LOF or VB. Good FM    ROS: Pertinent items are noted in HPI.    History reviewed. No pertinent past medical history.  History reviewed. No pertinent surgical history.  History reviewed. No pertinent family history.    Social History     Tobacco Use    Smoking status: Never    Smokeless tobacco: Never   Substance Use Topics    Alcohol use: Yes     Comment: occ    Drug use: No     Allergies: Patient has no allergy information on record.    Prenatal care with Aleman starting at 1st trimester with following problems:  None  Objective:      /85   Pulse 75   Temp 36.8 °C (98.3 °F) (Oral)   Resp 16   Ht 1.6 m (5' 3\")   Wt 88 kg (194 lb)   SpO2 97%     General:   no acute distress, alert, cooperative   Skin:   normal   HEENT:  Neck supple with midline trachea   Lungs:   CTA bilateral   Heart:   regular rate and rhythm   Abdomen:   gravid, NT   EFW:  7 lbs 8 oz   Pelvis:  adequate with gynecoid pelvis   FHT:  Reactive NST   Uterine Size: S=D   Presentations: Cephalic   Cervix:     Dilation: 2cm    Effacement: 50%    Station:  -3    Consistency: Medium    Position: Middle     Lab Review       Assessment:   Thong Soto at 40w2d  Labor status: IOL  Plan:     Admit to L&D  GBS positive - start PCN  Start Pitocin IOL  Epidural for pain           "

## 2023-12-03 NOTE — PROGRESS NOTES
- Received report from JEFF Doan.     - Dr. Paredes at bedside for epidural procedure.     - Dr. Aleman notified of decel seen on strip. Refer to flowsheets for interventions.     139- Dr. Aleman notified of decel seen on strip. Refer to flowsheets for interventions. SVE performed, refer to flowsheets, Dr. Aleman aware. Patient in stirrups and given instructions/coaching on pushing efforts.     302- Dr. Aleman called to bedside to evaluate pushing efforts and review fetal tracing.     030- Primary  section called due to fetal intolerance to labor, per Dr. Aleman.     0325- Pre-op completed.     0327- Patient rolling back to OR 2.     0330- Patient in OR 2.     0421- Patient rolling to PACU.     0422- Patient in PACU.     0555- Patient transferred to room 321. Report given to JEFF Ulloa. POC discussed. Light lochia rubra noted with a firm fundus one fingerbreadth below umbilicus. Bands verified x 2. VSS. All questions answered at this time.

## 2023-12-03 NOTE — OP REPORT
DATE OF SERVICE:  2023     PREOPERATIVE DIAGNOSES:  1.  Term intrauterine pregnancy at 40 and 3/7th weeks.  2.  Fetal intolerance to labor.  3.  Arrest of descent.     POSTOPERATIVE DIAGNOSES:  1.  Term intrauterine pregnancy at 40 and 3/7th weeks.  2.  Fetal intolerance to labor.  3.  Arrest of descent.     PROCEDURE PERFORMED:  Primary low transverse  section.     SURGEON:  Angelica Aleman MD     ASSISTANT:  Beth Pitts, Midwife     ESTIMATED BLOOD LOSS:  400 mL.     ANESTHESIA:  Epidural.     ANESTHESIOLOGIST:  Richard Paredes MD     FINDINGS:  A viable male infant, Apgars of 8 and 9, weight of 7 pounds 5.8   ounces with body cord x2.  The lower uterine segment was also very atonic.    Otherwise, no abnormalities were noted and normal tubes and ovaries   bilaterally.     DESCRIPTION OF PROCEDURE:  The patient was taken to the operating room where   epidural anesthesia was bolused.  The patient was then prepped and draped in   the usual sterile manner.  A formal time-out was called and IV antibiotics   were given with both azithromycin and Ancef.  A Pfannenstiel incision was made   with a knife down to rectus fascia.  The rectus fascia was  from the   underlying rectus muscle first superiorly, then inferiorly.  The rectus   muscle was  sharply in the midline.  The peritoneum was tented up and   entered bluntly and incision was extended with care to avoid injury to   underlying bowel or bladder.  Upon entry into the abdominal cavity, the   bladder was noted to be very edematous and was not draining well; however,   there was no injury to the bladder.  Vesicouterine peritoneum was taken down   and a bladder flap was created.  A 4 cm incision was made in the lower uterine   segment transversely and incision was extended with care to avoid injury to   underlying infant or to the lateral vessels.  The head was then guided towards   the hysterotomy incision and delivered.  The mouth  and air suctioned.  The   remainder of infant delivered without complications.  At the delivery of the   infant, there was noted to be the body cord x2, which was loose.  After a   30-second delay, the cord was doubly clamped and cut and the infant was handed   off to awaiting neonatology team.  The placenta was then manually extracted.    Fragments of membranes were removed.  The hysterotomy incision was   approximated with 0 chromic in a running locking stitch x1 followed by   imbricating stitch.  Hemostasis was noted.  The pericolic gutters were   examined and blood clots were removed.  The muscle and peritoneum was   approximated with 3 mattress sutures of 0 chromic.  The fascia was   approximated with #1 Vicryl.  The subcutaneous fat was irrigated and small   bleeders were bovied.  The subcutaneous fat was approximated with 3   interrupted sutures of #1 Vicryl.  The skin was approximated with Insorb   subcuticular staples.  Prineo mesh was then placed over the incision, followed   by Dermabond glue.  Sponge, needle, instrument and lap counts were correct   x2.  Then, the patient tolerated the procedure well and went to recovery room   in stable condition.  During the  section, the patient did have some   hypotensive blood pressures most likely secondary to the bolus of the epidural   and was given ephedrine as needed.        ______________________________  MD AARON Cardozo/MARVIN    DD:  2023 04:39  DT:  2023 06:01    Job#:  [id number]

## 2023-12-03 NOTE — PROGRESS NOTES
1700: Report received from JEFF Means.   1714: Called to room for leaking of clear fluid. SVE performed, 3/60/-3.   1730: Dr. Aleman updated on SROM.   1850: Report given to JEFF Bustos. Care relinquished.

## 2023-12-03 NOTE — ANESTHESIA POSTPROCEDURE EVALUATION
Patient: Thong Soto    Procedure Summary       Date: 23 Room / Location: [facility] OR 01 / SURGERY LABOR AND DELIVERY    Anesthesia Start:  Anesthesia Stop: 23    Procedure:  SECTION, PRIMARY (Abdomen) Diagnosis: (arrest of descent)    Surgeons: Angelica Aleman M.D. Responsible Provider: Richard Paredes M.D.    Anesthesia Type: general ASA Status: 2            Final Anesthesia Type: general  Last vitals  BP   Blood Pressure: 137/85    Temp   36.8 °C (98.3 °F)    Pulse   75   Resp   16    SpO2   97 %      Anesthesia Post Evaluation    Patient location during evaluation: floor  Patient participation: complete - patient participated  Level of consciousness: awake and alert  Pain score: 0    Airway patency: patent  Anesthetic complications: no  Cardiovascular status: hemodynamically stable  Respiratory status: acceptable  Hydration status: euvolemic    PONV: none        No notable events documented.

## 2023-12-03 NOTE — LACTATION NOTE
This note was copied from a baby's chart.  Initial consult:    40w3d infant male delivered to a  mother via primary c section for FITL.    History: No significant medical or surgical history noted.     Mom reports that baby recently latched for 20min on the right breast. She reports that it was uncomfortable. Baby awake, LC offered assistance with the other side, mom agrees.     Bilateral breast assessment WNL, nipples everted and intact, bruise noted to left areola from prior latch.     Baby placed skin to skin in football position on the left side. LC assisted with proper positioning, breast wedging and asymmetrical latch technique. Hand expression demonstrated and drops of colostrum were easily expressed. Baby asleep at the breast and not cueing to feed. Mom feels more confident in positioning and her ability to achieve a deep latch.     Breast feeding education provided: Education provided regarding the milk making process and supply in demand. Frequent skin to skin encouraged. Encouraged MOB to offer the breast to baby any time he is showing hunger cues (cues reviewed). Encouraged MOB not to limit baby's time at the breast. Anticipatory guidance provided regarding typical  feeding behaviors in the first 24-48hrs, including cluster feeding. Proper positioning and latch technique verbalized. Education provided regarding the importance of achieving a deep latch with each feeding to ensure proper stimulation, milk transfer, and reduce the chance for nipple damage/pain.     Plan: Continue offering the breast to baby on cue, a minimum of 8 times every 24hrs. Skin to skin for each feeding. Hand expression and feed back colostrum (with RN assistance) if baby due to feed, but too sleepy or unable to latch. Do not limit baby's time at the breast. Reach out to RN/LC if experiencing pain with latch or if unable to latch baby independently. Provided with [facility] outpatient LC resource list. Will place consult  to [facility].

## 2023-12-03 NOTE — ANESTHESIA PREPROCEDURE EVALUATION
Date: 12/02/23  Procedure: Labor Epidural         Relevant Problems   No relevant active problems       Physical Exam    Airway   Mallampati: II  TM distance: >3 FB  Neck ROM: full       Cardiovascular - normal exam  Rhythm: regular  Rate: normal  (-) murmur     Dental - normal exam           Pulmonary - normal exam  Breath sounds clear to auscultation     Abdominal    Neurological - normal exam               Anesthesia Plan    ASA 2       Plan - general               Induction: intravenous    Postoperative Plan: Postoperative administration of opioids is intended.    Pertinent diagnostic labs and testing reviewed    Informed Consent:    Anesthetic plan and risks discussed with patient.    Use of blood products discussed with: patient whom consented to blood products.

## 2023-12-03 NOTE — ANESTHESIA TIME REPORT
Anesthesia Start and Stop Event Times       Date Time Event    12/2/2023 2043 Ready for Procedure     2050 Anesthesia Start    12/3/2023 0426 Anesthesia Stop          Responsible Staff  12/02/23 to 12/03/23      Name Role Begin End    Richard Paredes M.D. Anesth 2050 0426          Overtime Reason:  no overtime (within assigned shift)    Comments:

## 2023-12-03 NOTE — PROGRESS NOTES
Patient complaining of congestion and requesting nasal spray multiple times. MD Aleman notified of congestion and order for a nasal spray received. No need to complete a nasal swab per MD Aleman.

## 2023-12-03 NOTE — ANESTHESIA PROCEDURE NOTES
Epidural Block    Date/Time: 12/2/2023 8:51 PM    Performed by: Richard Paredes M.D.  Authorized by: Richard Paredes M.D.    Patient Location:  OB  Start Time:  12/2/2023 8:51 PM  End Time:  12/2/2023 9:04 PM  Reason for Block: labor analgesia    patient identified, IV checked, site marked, risks and benefits discussed, surgical consent, monitors and equipment checked and pre-op evaluation    Patient Position:  Sitting  Prep: ChloraPrep, patient draped and sterile technique    Monitoring:  Blood pressure, continuous pulse oximetry and heart rate  Approach:  Midline  Location:  L3-L4  Injection Technique:  NELLY air  Skin infiltration:  Lidocaine  Strength:  1%  Dose:  3ml  Needle Type:  Tuohy  Needle Gauge:  17 G  Needle Length:  3.5 in  Loss of resistance::  8  Catheter Size:  19 G  Catheter at Skin Depth:  20  Test Dose Result:  Negative

## 2023-12-03 NOTE — H&P
Addendum to H&P    33 y.o.  @ 40w3d was brought in for IOL secondary to postdates. Pt was started on pitocin with a favorable cervix and had SROM. Pt received an epidural for pain management and went to complete dilation. Pt had been pushing for 1.5 hours and made no descent and had deep variables with late decels. Discussed with patient findings and recommend C/S.    A/P  TIUP @ 40w3d  FITL    Discussed with the patient indications for  delivery. The patient voiced understanding of indications for  section at this time.    Discussed with the patient the risks of  delivery. The risks include infection, bleeding, scarring, damage to other organs in the area of operation. Specifically organs that can be damaged are bowel, bladder, ureters. I also discussed with the patient the risk of wound infection and wound breakdown. We discussed that these risks are greater in people that have diabetes or obesity. I also discussed the risk of emergency blood transfusion during procedure as well as emergency hysterectomy during procedure.    Patient had the opportunity to ask questions regarding procedures. All questions answered to the patient's satisfaction.    Proceed with  delivery

## 2023-12-03 NOTE — PROGRESS NOTES
0745 Assessment completed. Lochia light, fundus firm. Plan of care reviewed. Declines pain intervention at this time, will call if pain intervention needed.

## 2023-12-04 PROCEDURE — 700102 HCHG RX REV CODE 250 W/ 637 OVERRIDE(OP): Performed by: OBSTETRICS & GYNECOLOGY

## 2023-12-04 PROCEDURE — 770002 HCHG ROOM/CARE - OB PRIVATE (112)

## 2023-12-04 PROCEDURE — A9270 NON-COVERED ITEM OR SERVICE: HCPCS | Performed by: OBSTETRICS & GYNECOLOGY

## 2023-12-04 PROCEDURE — 700111 HCHG RX REV CODE 636 W/ 250 OVERRIDE (IP): Mod: JZ | Performed by: OBSTETRICS & GYNECOLOGY

## 2023-12-04 RX ADMIN — ACETAMINOPHEN 1000 MG: 500 TABLET, FILM COATED ORAL at 20:02

## 2023-12-04 RX ADMIN — IBUPROFEN 800 MG: 800 TABLET, FILM COATED ORAL at 16:13

## 2023-12-04 RX ADMIN — DOCUSATE SODIUM 100 MG: 100 CAPSULE, LIQUID FILLED ORAL at 06:41

## 2023-12-04 RX ADMIN — ACETAMINOPHEN 1000 MG: 500 TABLET, FILM COATED ORAL at 13:16

## 2023-12-04 RX ADMIN — KETOROLAC TROMETHAMINE 30 MG: 30 INJECTION, SOLUTION INTRAMUSCULAR; INTRAVENOUS at 03:22

## 2023-12-04 RX ADMIN — ACETAMINOPHEN 1000 MG: 500 TABLET, FILM COATED ORAL at 06:41

## 2023-12-04 RX ADMIN — IBUPROFEN 800 MG: 800 TABLET, FILM COATED ORAL at 08:38

## 2023-12-04 RX ADMIN — PRENATAL WITH FERROUS FUM AND FOLIC ACID 1 TABLET: 3080; 920; 120; 400; 22; 1.84; 3; 20; 10; 1; 12; 200; 27; 25; 2 TABLET ORAL at 08:38

## 2023-12-04 ASSESSMENT — PAIN DESCRIPTION - PAIN TYPE
TYPE: ACUTE PAIN
TYPE: ACUTE PAIN

## 2023-12-04 ASSESSMENT — EDINBURGH POSTNATAL DEPRESSION SCALE (EPDS)
I HAVE BEEN ANXIOUS OR WORRIED FOR NO GOOD REASON: YES, SOMETIMES
I HAVE FELT SAD OR MISERABLE: NOT VERY OFTEN
I HAVE BLAMED MYSELF UNNECESSARILY WHEN THINGS WENT WRONG: YES, SOME OF THE TIME
I HAVE BEEN SO UNHAPPY THAT I HAVE HAD DIFFICULTY SLEEPING: NOT AT ALL
I HAVE BEEN SO UNHAPPY THAT I HAVE BEEN CRYING: ONLY OCCASIONALLY
THE THOUGHT OF HARMING MYSELF HAS OCCURRED TO ME: NEVER
THINGS HAVE BEEN GETTING ON TOP OF ME: NO, MOST OF THE TIME I HAVE COPED QUITE WELL
I HAVE BEEN ABLE TO LAUGH AND SEE THE FUNNY SIDE OF THINGS: AS MUCH AS I ALWAYS COULD
I HAVE LOOKED FORWARD WITH ENJOYMENT TO THINGS: AS MUCH AS I EVER DID
I HAVE FELT SCARED OR PANICKY FOR NO GOOD REASON: NO, NOT MUCH

## 2023-12-04 NOTE — PROGRESS NOTES
Received report, assumed pt care. Pt awake trying to latch infant. Resting comfortably in bed with call light, bedside table in reach. No c/o at this time. Side rails up 2. Instructed to use call light when needing assistance verbalized understanding. Bed alarm n/a, bed in low position. Assisted pt with latching, unsuccessful, pt encouraged to do skin to skin. RN to notify lactation. Will continue to monitor.

## 2023-12-04 NOTE — LACTATION NOTE
This note was copied from a baby's chart.  Follow up consult:    Mom reports that latching has been difficult. She is having trouble achieving a deep latch on both sides, the left side is tender from a bruise that was created when baby latched to her areola. The right side has a compression stripe. She reports that she has been mostly attempting to latch on the right side dur to the left sided tenderness. She reports that she was able to achieve one deep/comfortable latch on the right side last night that lasted 14min but has not been able to achieve a good latch since. Both nipples are everted, but are short.     offered latch assistance and mom agrees. Several attempts made to latch baby in football position on the right side, he was rooting, but would only hold the nipple in his mouth.     Nipple shield initiated. LC demonstrated NS application. Baby was able to achieve a deep latch with the shield, but continued to move between brief organized suckles and disorganized and bitey sucks. He was not able to sustain a nutritive feeding pattern at this time at the breast.    Due to sub optimal latch score past 24hrs of birth, and initiation on a nipple shield, a three step feeding plan is initiated.     Pump initiated. Settings and pump use reviewed and demonstrated. Using 25mm flanges, fit appropriate and comfortable, pumping at 80cpm for 2 min and then turning down to 60cpm, 15% suction to comfort, pump for a total of 15min every 2-3hrs. Follow by 2 min hand expression. Handouts provided: How to Maximize Milk Production, Pump Parts Washing Guide, and Milk Storage Guidelines. Anticipatory guidance provided regarding typical volumes of colostrum expressed in the first 1-3 days. Encouraged MOB to continue pumping every 2-3hrs even if no milk or colostrum expressed in the first days. Discussed milk making process and supply in demand. Mom reports that she does have an electric breast pump at home.     Parents elect to  supplement with donor breast milk. Paced bottle feeding taught to parents. Supplemental feeding guidelines handout provided and explained.     Continue to breast feed baby per cues, at least once every three hours. Follow breast feeding with supplementation, with volumes outlined by provider, or on Supplemental Feeding Guidelines handout. Pump (80cpm down to 60cpm after 2min, suction to comfort, for 15 total minutes), every three hours or a minimum of 8-12 times every 24hrs. [facility] Breastfeeding Resource handout provided. Follow up outpatient with [facility]. A referral was placed yesterday, will follow up with an email to ensure timely follow up.

## 2023-12-04 NOTE — CARE PLAN
The patient is Stable - Low risk of patient condition declining or worsening    Shift Goals  Clinical Goals: Frim fundus, light lochia, pain control  Patient Goals: good latch  Family Goals: MARY    Progress made toward(s) clinical / shift goals: latch     Problem: Pain - Standard  Goal: Alleviation of pain or a reduction in pain to the patient’s comfort goal  Outcome: Progressing  Note: Pt declines pain at this time but is on scheduled Tylenol, RN will continue to assess. Goal is for pt's pain to be controlled on orals at time of DC.      Problem: Early Mobilization - Post Surgery  Goal: Early mobilization post surgery  Outcome: Progressing  Note: Pt ambulating around the room independently without issue. Goal is for pt to remain independent at time of DC.        Patient is not progressing towards the following goals:

## 2023-12-04 NOTE — CARE PLAN
Problem: Knowledge Deficit - Postpartum  Goal: Patient will verbalize and demonstrate understanding of self and infant care  Outcome: Progressing     Problem: Psychosocial - Postpartum  Goal: Patient will verbalize and demonstrate effective bonding and parenting behavior  Outcome: Progressing     Problem: Infection - Postpartum  Goal: Postpartum patient will be free of signs and symptoms of infection  Outcome: Progressing   The patient is Stable - Low risk of patient condition declining or worsening    Shift Goals  Clinical Goals: stable VS and lochia WNL  Patient Goals: rest and feeed baby  Family Goals: support    Progress made toward(s) clinical / shift goals:    Pt reports comfort after pain interventions, incision cdi, Fundus firm and lochia light, VSS, educated on POC, needs met at this time. Questions answered. Will continue to educate.

## 2023-12-04 NOTE — PROGRESS NOTES
Assessment completed, assisted pt with latching infant with success. No additional needs at this time, call light within reach.

## 2023-12-04 NOTE — CARE PLAN
The patient is Stable - Low risk of patient condition declining or worsening    Shift Goals  Clinical Goals: Patient VSS; Lochia WDL; Pain WDL; Tolerate Ambulation  Patient Goals: Rest and comfort.  Family Goals: support and education    Progress made toward(s) clinical / shift goals:    Problem: Knowledge Deficit - L&D  Goal: Patient and family/caregivers will demonstrate understanding of plan of care, disease process/condition, diagnostic tests and medications  12/3/2023 1648 by Charo Love R.N.  Outcome: Progressing  12/3/2023 1647 by Charo Love R.N.  Outcome: Progressing     Problem: Risk for Excess Fluid Volume  Goal: Patient will demonstrate pulse, blood pressure and neurologic signs within expected ranges and without any respiratory complications  12/3/2023 1648 by Charo Love R.N.  Outcome: Progressing  12/3/2023 1647 by Charo Love R.N.  Outcome: Progressing     Problem: Pain - Standard  Goal: Alleviation of pain or a reduction in pain to the patient’s comfort goal  12/3/2023 1648 by Charo Love R.N.  Outcome: Progressing  12/3/2023 1647 by Charo Love, R.N.  Outcome: Progressing     Problem: Knowledge Deficit - Standard  Goal: Patient and family/care givers will demonstrate understanding of plan of care, disease process/condition, diagnostic tests and medications  12/3/2023 1648 by Charo Love R.N.  Outcome: Progressing  12/3/2023 1647 by Charo Love R.N.  Outcome: Progressing     Problem: Knowledge Deficit - Postpartum  Goal: Patient will verbalize and demonstrate understanding of self and infant care  Outcome: Progressing     Problem: Psychosocial - Postpartum  Goal: Patient will verbalize and demonstrate effective bonding and parenting behavior  Outcome: Progressing     Problem: Altered Physiologic Condition  Goal: Patient physiologically stable as evidenced by normal lochia, palpable uterine involution and vitals within  normal limits  Outcome: Progressing     Problem: Infection - Postpartum  Goal: Postpartum patient will be free of signs and symptoms of infection  Outcome: Progressing     Problem: Respiratory/Oxygenation Function Post-Surgical  Goal: Patient will achieve/maintain normal respiratory rate/effort  Outcome: Progressing     Problem: Bowel Elimination - Post Surgical  Goal: Patient will resume regular bowel sounds and function with no discomfort or distention  Outcome: Progressing     Problem: Early Mobilization - Post Surgery  Goal: Early mobilization post surgery  Outcome: Progressing

## 2023-12-04 NOTE — PROGRESS NOTES
"Thong Soto PP day 1 POD 1    Subjective: Abdominal pain. minimal, ambulating .yes, tolerating liquids .yes, tolerating regular diet .yes, flatus.yes, BM .no, Bleeding .light, voiding .yes,dizziness .no, breast feeding.yes, breast tenderness .no    /82   Pulse 95   Temp 36.7 °C (98.1 °F) (Temporal)   Resp 18   Ht 1.6 m (5' 3\")   Wt 88 kg (194 lb)   SpO2 95%   Breast Exam: Tenderness .no, Engourgement .no, Mastitis .no  Abdomen soft, non-tender. BS normal. No masses,  No organomegaly  Incision: dry and intact  Fundus Tenderness:no, Below umbilicus:Yes, firm  Perineumperineum intact  ExtremitiesNormal    Meds:   No current facility-administered medications on file prior to encounter.     Current Outpatient Medications on File Prior to Encounter   Medication Sig Dispense Refill    medroxyPROGESTERone (DEPO-PROVERA) 150 MG/ML Suspension 150 mg by Intramuscular route Once.      Pseudoeph-Doxylamine-DM-APAP (NYQUIL PO) Take  by mouth. (Patient not taking: Reported on 7/13/2021)      Pseudoephedrine-APAP-DM (DAYQUIL PO) Take  by mouth. (Patient not taking: Reported on 7/13/2021)         Lab:   Recent Results (from the past 48 hour(s))   Hold Blood Bank Specimen (Not Tested)    Collection Time: 12/02/23 11:40 AM   Result Value Ref Range    Holding Tube - Bb DONE    CBC with differential    Collection Time: 12/02/23 11:40 AM   Result Value Ref Range    WBC 10.7 4.8 - 10.8 K/uL    RBC 4.06 (L) 4.20 - 5.40 M/uL    Hemoglobin 12.8 12.0 - 16.0 g/dL    Hematocrit 36.2 (L) 37.0 - 47.0 %    MCV 89.2 81.4 - 97.8 fL    MCH 31.5 27.0 - 33.0 pg    MCHC 35.4 32.2 - 35.5 g/dL    RDW 41.1 35.9 - 50.0 fL    Platelet Count 135 (L) 164 - 446 K/uL    MPV 13.2 (H) 9.0 - 12.9 fL    Neutrophils-Polys 80.90 (H) 44.00 - 72.00 %    Lymphocytes 13.10 (L) 22.00 - 41.00 %    Monocytes 4.60 0.00 - 13.40 %    Eosinophils 0.30 0.00 - 6.90 %    Basophils 0.40 0.00 - 1.80 %    Immature Granulocytes 0.70 0.00 - 0.90 %    Nucleated RBC 0.00 0.00 " - 0.20 /100 WBC    Neutrophils (Absolute) 8.63 (H) 1.82 - 7.42 K/uL    Lymphs (Absolute) 1.40 1.00 - 4.80 K/uL    Monos (Absolute) 0.49 0.00 - 0.85 K/uL    Eos (Absolute) 0.03 0.00 - 0.51 K/uL    Baso (Absolute) 0.04 0.00 - 0.12 K/uL    Immature Granulocytes (abs) 0.07 0.00 - 0.11 K/uL    NRBC (Absolute) 0.00 K/uL   T.PALLIDUM AB CHRISTOPHER (Syphilis)    Collection Time: 12/02/23 11:40 AM   Result Value Ref Range    Syphilis, Treponemal Qual Non-Reactive Non-Reactive   CBC without differential- Once in 12 hours post delivery    Collection Time: 12/03/23  3:25 PM   Result Value Ref Range    WBC 18.1 (H) 4.8 - 10.8 K/uL    RBC 3.48 (L) 4.20 - 5.40 M/uL    Hemoglobin 10.8 (L) 12.0 - 16.0 g/dL    Hematocrit 31.7 (L) 37.0 - 47.0 %    MCV 91.1 81.4 - 97.8 fL    MCH 31.0 27.0 - 33.0 pg    MCHC 34.1 32.2 - 35.5 g/dL    RDW 41.5 35.9 - 50.0 fL    Platelet Count 115 (L) 164 - 446 K/uL    MPV 13.5 (H) 9.0 - 12.9 fL       Assessment and Plan  normal postpartum course  No heavy bleeding or foul vaginal discharge     Continue Routine postpartum care  Ambulate

## 2023-12-05 PROCEDURE — 700102 HCHG RX REV CODE 250 W/ 637 OVERRIDE(OP): Performed by: OBSTETRICS & GYNECOLOGY

## 2023-12-05 PROCEDURE — A9270 NON-COVERED ITEM OR SERVICE: HCPCS | Performed by: OBSTETRICS & GYNECOLOGY

## 2023-12-05 PROCEDURE — 770002 HCHG ROOM/CARE - OB PRIVATE (112)

## 2023-12-05 RX ADMIN — PRENATAL WITH FERROUS FUM AND FOLIC ACID 1 TABLET: 3080; 920; 120; 400; 22; 1.84; 3; 20; 10; 1; 12; 200; 27; 25; 2 TABLET ORAL at 08:10

## 2023-12-05 RX ADMIN — DOCUSATE SODIUM 100 MG: 100 CAPSULE, LIQUID FILLED ORAL at 21:34

## 2023-12-05 RX ADMIN — IBUPROFEN 800 MG: 800 TABLET, FILM COATED ORAL at 01:59

## 2023-12-05 RX ADMIN — IBUPROFEN 800 MG: 800 TABLET, FILM COATED ORAL at 18:22

## 2023-12-05 RX ADMIN — ACETAMINOPHEN 1000 MG: 500 TABLET, FILM COATED ORAL at 02:00

## 2023-12-05 RX ADMIN — ACETAMINOPHEN 1000 MG: 500 TABLET, FILM COATED ORAL at 15:22

## 2023-12-05 RX ADMIN — IBUPROFEN 800 MG: 800 TABLET, FILM COATED ORAL at 10:18

## 2023-12-05 RX ADMIN — ACETAMINOPHEN 1000 MG: 500 TABLET, FILM COATED ORAL at 21:34

## 2023-12-05 RX ADMIN — ACETAMINOPHEN 1000 MG: 500 TABLET, FILM COATED ORAL at 08:10

## 2023-12-05 ASSESSMENT — PAIN DESCRIPTION - PAIN TYPE
TYPE: ACUTE PAIN
TYPE: ACUTE PAIN
TYPE: ACUTE PAIN;SURGICAL PAIN
TYPE: ACUTE PAIN;SURGICAL PAIN
TYPE: ACUTE PAIN

## 2023-12-05 NOTE — PROGRESS NOTES
"Elananuja Brittany PP day 2 POD 2    Subjective: Abdominal pain. Minimal and with movement, ambulating .yes, tolerating liquids .yes, tolerating regular diet .yes, flatus.yes, BM .no, Bleeding .minimal, voiding .yes,dizziness .no, breast feeding.yes, breast tenderness .no    /81   Pulse 92   Temp 36 °C (96.8 °F) (Temporal)   Resp 18   Ht 1.6 m (5' 3\")   Wt 88 kg (194 lb)   SpO2 96%   Breast Exam: Tenderness .no, Engourgement .no, Mastitis .no  Abdomen soft, non-tender. BS normal. No masses,  No organomegaly  Incision: dry and intact  Fundus Tenderness:no, Below umbilicus:Yes  Perineumperineum intact  ExtremitiesNormal    Meds:   No current facility-administered medications on file prior to encounter.     Current Outpatient Medications on File Prior to Encounter   Medication Sig Dispense Refill    medroxyPROGESTERone (DEPO-PROVERA) 150 MG/ML Suspension 150 mg by Intramuscular route Once.      Pseudoeph-Doxylamine-DM-APAP (NYQUIL PO) Take  by mouth. (Patient not taking: Reported on 7/13/2021)      Pseudoephedrine-APAP-DM (DAYQUIL PO) Take  by mouth. (Patient not taking: Reported on 7/13/2021)         Lab:   Recent Results (from the past 48 hour(s))   CBC without differential- Once in 12 hours post delivery    Collection Time: 12/03/23  3:25 PM   Result Value Ref Range    WBC 18.1 (H) 4.8 - 10.8 K/uL    RBC 3.48 (L) 4.20 - 5.40 M/uL    Hemoglobin 10.8 (L) 12.0 - 16.0 g/dL    Hematocrit 31.7 (L) 37.0 - 47.0 %    MCV 91.1 81.4 - 97.8 fL    MCH 31.0 27.0 - 33.0 pg    MCHC 34.1 32.2 - 35.5 g/dL    RDW 41.5 35.9 - 50.0 fL    Platelet Count 115 (L) 164 - 446 K/uL    MPV 13.5 (H) 9.0 - 12.9 fL       Assessment and Plan  normal postpartum course  No heavy bleeding or foul vaginal discharge     Continue Routine postpartum care  Ambulate    "

## 2023-12-05 NOTE — CARE PLAN
The patient is Stable - Low risk of patient condition declining or worsening    Shift Goals  Clinical Goals: Pain will maintain stable vital signs  Patient Goals: good latch  Family Goals: MARY    Progress made toward(s) clinical / shift goals:    Problem: Knowledge Deficit - L&D  Goal: Patient and family/caregivers will demonstrate understanding of plan of care, disease process/condition, diagnostic tests and medications  Outcome: Progressing     Problem: Risk for Excess Fluid Volume  Goal: Patient will demonstrate pulse, blood pressure and neurologic signs within expected ranges and without any respiratory complications  Outcome: Progressing     Problem: Pain - Standard  Goal: Alleviation of pain or a reduction in pain to the patient’s comfort goal  Outcome: Progressing     Problem: Knowledge Deficit - Standard  Goal: Patient and family/care givers will demonstrate understanding of plan of care, disease process/condition, diagnostic tests and medications  Outcome: Progressing     Problem: Knowledge Deficit - Postpartum  Goal: Patient will verbalize and demonstrate understanding of self and infant care  Outcome: Progressing     Problem: Psychosocial - Postpartum  Goal: Patient will verbalize and demonstrate effective bonding and parenting behavior  Outcome: Progressing     Problem: Altered Physiologic Condition  Goal: Patient physiologically stable as evidenced by normal lochia, palpable uterine involution and vitals within normal limits  Outcome: Progressing     Problem: Infection - Postpartum  Goal: Postpartum patient will be free of signs and symptoms of infection  Outcome: Progressing     Problem: Respiratory/Oxygenation Function Post-Surgical  Goal: Patient will achieve/maintain normal respiratory rate/effort  Outcome: Progressing     Problem: Bowel Elimination - Post Surgical  Goal: Patient will resume regular bowel sounds and function with no discomfort or distention  Outcome: Progressing     Problem: Early  Mobilization - Post Surgery  Goal: Early mobilization post surgery  Outcome: Progressing       Patient is not progressing towards the following goals:

## 2023-12-05 NOTE — PROGRESS NOTES
Assessment complete. POC reviewed. Patient requests abdominal binder. Hand expression and pumping reviewed with patient. Pt encouraged to call for assistance with 3 step plan. Pt has call light at bedside and knows to call for assistance as needed.     2300: Pt requests to not be woken up for scheduled pain medication and states that she will call when she wakes up for medications.

## 2023-12-05 NOTE — LACTATION NOTE
Thong is a 33 year old  who delivered via C/S after failed induction on 12/3 at 0344. Baby boy Home was born at 40+3 weeks and weighed 7 lbs. 5.8 ounces. His current weight is 6 lbs. 13.2 ounces, (-7.3%).     Thong's breasts are symmetrical with everted short nipples. Her left nipple is scabbed. Right nipple is intact. She denies any history of PCOS, breast surgery, or thyroid concerns. No tongue or lip tie noted on baby Home.     At time of visit, Thong was attempted a latch on right breast, football hold. Home was unable to achieve a deep latch or nutritive suckle. Nipple shield applied and he woke and latched eagerly with deep, nutritive suckles. We reviewed using colostrum/milk for nipple healing.    Home is on a three step feeing plan; nursing with the shield, mom is pumping, supplementation per guidelines.     Outpatient lactation referral has been placed. Continuing support offered.

## 2023-12-06 VITALS
TEMPERATURE: 97.7 F | RESPIRATION RATE: 18 BRPM | SYSTOLIC BLOOD PRESSURE: 136 MMHG | HEART RATE: 81 BPM | OXYGEN SATURATION: 99 % | BODY MASS INDEX: 34.38 KG/M2 | DIASTOLIC BLOOD PRESSURE: 80 MMHG | WEIGHT: 194 LBS | HEIGHT: 63 IN

## 2023-12-06 PROCEDURE — 90707 MMR VACCINE SC: CPT | Performed by: OBSTETRICS & GYNECOLOGY

## 2023-12-06 PROCEDURE — 700102 HCHG RX REV CODE 250 W/ 637 OVERRIDE(OP): Performed by: OBSTETRICS & GYNECOLOGY

## 2023-12-06 PROCEDURE — 90471 IMMUNIZATION ADMIN: CPT

## 2023-12-06 PROCEDURE — 700111 HCHG RX REV CODE 636 W/ 250 OVERRIDE (IP): Performed by: OBSTETRICS & GYNECOLOGY

## 2023-12-06 PROCEDURE — RXMED WILLOW AMBULATORY MEDICATION CHARGE: Performed by: OBSTETRICS & GYNECOLOGY

## 2023-12-06 PROCEDURE — A9270 NON-COVERED ITEM OR SERVICE: HCPCS | Performed by: OBSTETRICS & GYNECOLOGY

## 2023-12-06 RX ORDER — IBUPROFEN 800 MG/1
800 TABLET ORAL EVERY 8 HOURS
Qty: 30 TABLET | Refills: 1 | Status: SHIPPED | OUTPATIENT
Start: 2023-12-06

## 2023-12-06 RX ORDER — OXYCODONE HYDROCHLORIDE AND ACETAMINOPHEN 5; 325 MG/1; MG/1
1 TABLET ORAL EVERY 4 HOURS PRN
Qty: 30 TABLET | Refills: 0 | Status: SHIPPED | OUTPATIENT
Start: 2023-12-06 | End: 2023-12-19

## 2023-12-06 RX ORDER — PSEUDOEPHEDRINE HCL 30 MG
100 TABLET ORAL 2 TIMES DAILY PRN
Qty: 60 CAPSULE | Refills: 1 | Status: SHIPPED | OUTPATIENT
Start: 2023-12-06

## 2023-12-06 RX ADMIN — IBUPROFEN 800 MG: 800 TABLET, FILM COATED ORAL at 03:08

## 2023-12-06 RX ADMIN — ACETAMINOPHEN 1000 MG: 500 TABLET, FILM COATED ORAL at 06:48

## 2023-12-06 RX ADMIN — PRENATAL WITH FERROUS FUM AND FOLIC ACID 1 TABLET: 3080; 920; 120; 400; 22; 1.84; 3; 20; 10; 1; 12; 200; 27; 25; 2 TABLET ORAL at 08:28

## 2023-12-06 RX ADMIN — MEASLES, MUMPS, AND RUBELLA VIRUS VACCINE LIVE 0.5 ML: 1000; 12500; 1000 INJECTION, POWDER, LYOPHILIZED, FOR SUSPENSION SUBCUTANEOUS at 13:49

## 2023-12-06 RX ADMIN — DOCUSATE SODIUM 100 MG: 100 CAPSULE, LIQUID FILLED ORAL at 08:28

## 2023-12-06 RX ADMIN — IBUPROFEN 800 MG: 800 TABLET, FILM COATED ORAL at 12:06

## 2023-12-06 RX ADMIN — ACETAMINOPHEN 1000 MG: 500 TABLET, FILM COATED ORAL at 13:49

## 2023-12-06 ASSESSMENT — PAIN DESCRIPTION - PAIN TYPE
TYPE: ACUTE PAIN;SURGICAL PAIN
TYPE: SURGICAL PAIN;ACUTE PAIN
TYPE: ACUTE PAIN;SURGICAL PAIN
TYPE: SURGICAL PAIN;ACUTE PAIN

## 2023-12-06 NOTE — CARE PLAN
Problem: Pain - Standard  Goal: Alleviation of pain or a reduction in pain to the patient’s comfort goal  Outcome: Progressing     Problem: Infection - Postpartum  Goal: Postpartum patient will be free of signs and symptoms of infection  Outcome: Progressing     Problem: Early Mobilization - Post Surgery  Goal: Early mobilization post surgery  Outcome: Progressing     The patient is Stable - Low risk of patient condition declining or worsening    Shift Goals  Clinical Goals: Pain control, lochia WDL, no s/s infection  Patient Goals: rooming in, bonding  Family Goals: support, bonding    Progress made toward(s) clinical / shift goals:  Pain well controlled with non-narcotic analgesia, pt is able to ambulate and care for self and infant without difficulty. Lochia remains light without clots expressed, performing maricarmen care and pad changes independently. No s/s infection noted on assessment, remains afebrile.    Patient is not progressing towards the following goals: NA

## 2023-12-06 NOTE — PROGRESS NOTES
0700- Received report from JEFF Cortes. Assumed care. 12 hour chart check, MAR and orders reviewed.      0800- Attempted to do patient assessment. Patient currently working with  at bedside.     1100- Assessment complete. Fundus firm and palpable, lochia scant rubra. Pain management and interventions discussed with pt. POC discussed. All questions and concerns discussed. No further concerns.

## 2023-12-06 NOTE — DISCHARGE SUMMARY
Discharge Summary:      Thong Soto    Admit Date:   2023  Discharge Date:  2023     Admitting diagnosis:  Encounter for induction of labor [Z34.90]  Discharge Diagnosis: Status post  for fetal distress, failure to progress.  Pregnancy Complications: none  Tubal Ligation:  no        History:  History reviewed. No pertinent past medical history.  OB History    Para Term  AB Living   1 1 1     1   SAB IAB Ectopic Molar Multiple Live Births           0 1      # Outcome Date GA Lbr Viktor/2nd Weight Sex Delivery Anes PTL Lv   1 Term 23 40w3d  3.34 kg (7 lb 5.8 oz) M CS-LTranv EPI N JANAE      Complications: Fetal Intolerance        Patient has no known allergies.  There are no problems to display for this patient.       Hospital Course:   33 y.o. , now para 1, was admitted with the above mentioned diagnosis, underwent Induction of Labor,  for fetal distress, failure to progress. Patient postpartum course was unremarkable, with progressive advancement in diet , ambulation and toleration of oral analgesia. Patient without complaints today and desires discharge.      Vitals:    23 2224 23 0600 23 1730 23 0600   BP: 124/87 135/81 131/89 136/80   Pulse:  92 83 81   Resp:  18 17 18   Temp:  36 °C (96.8 °F) 37.1 °C (98.8 °F) 36.5 °C (97.7 °F)   TempSrc:  Temporal Temporal Temporal   SpO2:  96% 98% 99%   Weight:       Height:           Exam:  Breast Exam: negative  Abdomen: Abdomen soft, non-tender. BS normal. No masses,  No organomegaly  Fundus Non Tender: yes  Incision: dry and intact  Perineum: perineum intact  Extremity: extremities, peripheral pulses and reflexes normal     Labs:  Recent Labs     23  1525   WBC 18.1*   RBC 3.48*   HEMOGLOBIN 10.8*   HEMATOCRIT 31.7*   MCV 91.1   MCH 31.0   MCHC 34.1   RDW 41.5   PLATELETCT 115*   MPV 13.5*        Activity:   Discharge to home  Pelvic Rest x 6 weeks    Assessment:  normal postpartum  course  Discharge Assessment: No areas of skin breakdown/redness; surgical incision intact/healing     Follow up: 2 week for incision check.      Discharge Meds:   Current Outpatient Medications   Medication Sig Dispense Refill    docusate sodium 100 MG Cap Take 100 mg by mouth 2 times a day as needed for Constipation. 60 Capsule 1    ibuprofen (MOTRIN) 800 MG Tab Take 1 Tablet by mouth every 8 hours. 30 Tablet 1    oxyCODONE-acetaminophen (PERCOCET) 5-325 MG Tab Take 1 Tablet by mouth every four hours as needed for Moderate Pain or Severe Pain for up to 7 days. 30 Tablet 0       Angelica Aleman M.D.

## 2023-12-06 NOTE — PROGRESS NOTES
Discharge paperwork for infant and mom discussed at bedside. All questions answered. Follow-up appointments reviewed. Parents aware of NBS #2 and dates to complete it by. Paperwork signed and dated at this time.    1425- Patient and infant discharged with escort off unit. Infant discharged in car seat, patient walking with wheelchair. Infant placed in car seat by parents and checked by RN. Cuddles removed. Bands verified. All questions answered at this time.

## 2023-12-06 NOTE — LACTATION NOTE
This note was copied from a baby's chart.  Follow up consult:    MOB reports that she has continued to work the three step feeding plan. She reports that she was not as consistent with the pump last night, but she plans on getting back on q3h schedule today. She reports that at 0600 she pumped about 10mL.    Baby has been latching with the NS, mom reports that he is improving with the latch. Baby is cueing to feed, LC offered assistance and mom agrees.     Bilateral breasts soft and symmetrical. R nipple intact, L nipple with old scab noted.     Baby vigorous at the breast, latching and pulling off to cry frequently. He appears frustrated and hungry. LC suggested to move on with the plan after about 10min of attempts. Latch not achieved. Mom pumping and baby will be supplemented with DBM.     Provided mom with [facility] [facility] information. She will reach out to them to coordinate donor breast milk delivery for home. LC encouraged her to reach out to them as soon as possible to ensure that the milk reaches them before they arrive home. If no DBM available, formula will need to be used for supplementation.      encouraged her to reach out to [facility] to get an outpatient follow up appointment scheduled. Referral and email sent Monday.     Plan: Continue to breast feed baby per cues, at least once every three hours. Follow breast feeding with supplementation, with volumes outlined by provider, or on Supplemental Feeding Guidelines handout. Pump (80cpm down to 60cpm after 2min, suction to comfort, for 15 total minutes), every three hours or a minimum of 8-12 times every 24hrs.

## 2023-12-06 NOTE — DISCHARGE INSTRUCTIONS

## 2023-12-06 NOTE — CARE PLAN
The patient is Stable - Low risk of patient condition declining or worsening    Shift Goals  Clinical Goals: VSS, BF, pain management  Patient Goals: rooming in, bonding  Family Goals: support, bonding    Progress made toward(s) clinical / shift goals:    Problem: Knowledge Deficit - L&D  Goal: Patient and family/caregivers will demonstrate understanding of plan of care, disease process/condition, diagnostic tests and medications  Outcome: Progressing     Problem: Risk for Excess Fluid Volume  Goal: Patient will demonstrate pulse, blood pressure and neurologic signs within expected ranges and without any respiratory complications  Outcome: Progressing     Problem: Pain - Standard  Goal: Alleviation of pain or a reduction in pain to the patient’s comfort goal  Outcome: Progressing     Problem: Knowledge Deficit - Standard  Goal: Patient and family/care givers will demonstrate understanding of plan of care, disease process/condition, diagnostic tests and medications  Outcome: Progressing     Problem: Knowledge Deficit - Postpartum  Goal: Patient will verbalize and demonstrate understanding of self and infant care  Outcome: Progressing     Problem: Psychosocial - Postpartum  Goal: Patient will verbalize and demonstrate effective bonding and parenting behavior  Outcome: Progressing     Problem: Altered Physiologic Condition  Goal: Patient physiologically stable as evidenced by normal lochia, palpable uterine involution and vitals within normal limits  Outcome: Progressing     Problem: Infection - Postpartum  Goal: Postpartum patient will be free of signs and symptoms of infection  Outcome: Progressing     Problem: Respiratory/Oxygenation Function Post-Surgical  Goal: Patient will achieve/maintain normal respiratory rate/effort  Outcome: Progressing     Problem: Bowel Elimination - Post Surgical  Goal: Patient will resume regular bowel sounds and function with no discomfort or distention  Outcome: Progressing      Problem: Early Mobilization - Post Surgery  Goal: Early mobilization post surgery  Outcome: Progressing       Patient is not progressing towards the following goals:

## 2023-12-14 ENCOUNTER — PHARMACY VISIT (OUTPATIENT)
Dept: PHARMACY | Facility: MEDICAL CENTER | Age: 33
End: 2023-12-14
Payer: COMMERCIAL

## 2024-01-03 ENCOUNTER — NON-PROVIDER VISIT (OUTPATIENT)
Dept: OBGYN | Facility: CLINIC | Age: 34
End: 2024-01-03
Payer: COMMERCIAL

## 2024-01-03 NOTE — PROGRESS NOTES
Summary: Thong has been breastfeeding every 1.5-3 hours day and night. Offering one breast for most feedings, up to 30 minutes. Pumping 1x daily, mostly with the manual pump, yielding 5-6oz between both breasts. Offering 1 replacement bottle daily, 2-3.5oz.    Today: Fed 1.5 hours prior to appointment. Latched to the right breast, transferred 20mls in 14 minutes. Latched to the left breast, cross cradle, transferred 40mls. Some fussiness, soothed with pacifier and soft bouncing. Fell asleep quickly in the car seat.   Plan: Breastfeed every 1.5-3 hours throughout the day, no need to wake Home for nighttime feedings. Offer both breasts for most feedings, up to 10-15 minutes on each side. Continue to pump 1x daily and offer replacement bottles as desired, offer 2.5-3.5oz if not breastfeeding.     Follow up:   Lactation appointment:  2024  Baby 's Provider appointment: 2 Month Well Child Check   Referrals: None    Maternal Diagnosis/Problem:  Sore nipples due to lactation and Lactation Problem  Infant Diagnosis/Problem:  At risk for breastfeeding difficulties    Subjective:     Thong Soto is a 33 y.o. female here for lactation care. She is here today with  her baby, Home .    Concerns:   Maternal: Weight check, Infant feeding evaluation, and Breastfeeding questions   Infant: Baby always seems hungry    HPI:   Pertinent  history: c/section    Mother does not have a history of advanced maternal age, GDM, hypertension prior to pregnancy, GHTN, insulin resistance, multiple gestation, PCOS, thyroid disease, placenta encapsulation, and breast surgery    Mother does have auto immune disease, testing for MS. Common condition(s) that may interfere in milk supply.    Breast changes in pregnancy: Yes  History of breast surgeries: No    FEEDING HISTORY:    Previous Breastfeeding History: First baby.   Hospital Course: Triple feeding initiated and continued at discharge.   Currently 1/3/2024:  Breastfeeding every 1.5-3 hours day and night. Offering one breast for most feedings, up to 30 minutes. Pumping 1x daily, mostly with the manual pump, yielding 5-6oz between both breasts. Offering 1 replacement bottle daily, 2-3.5oz.      Both breasts: Yes, mostly alternating at each feeding    Supplement: None, offering occasional replacement bottles   Quantity: 2-3.5oz    Nipple Shield Use: No longer using    Breast Pumping:  Frequency: 1x  Quantity Obtained: 5-6oz  Type of Pump: Medela, Spectra S9 and Manual Pump  Flange size/type:   Wearable: Spectra S9   NO pain with pumping    Maternal ROS:  Constitutional: No fever, chills. Feeling well  Breasts: No soreness of breasts and Soreness of nipples  Psychiatric: Managing ok  Mental Health: No mention of feeling irritable, agitated, angry, overwhelmed, apathetic, appetite changes, exhausted nor having sleep changes outside infant feeds/demands.  Current Outpatient Medications on File Prior to Visit   Medication Sig Dispense Refill    docusate sodium 100 MG Cap Take 100 mg by mouth 2 times a day as needed for Constipation. 60 Capsule 1    ibuprofen (MOTRIN) 800 MG Tab Take 1 Tablet by mouth every 8 hours. 30 Tablet 1     No current facility-administered medications on file prior to visit.      No past medical history on file.      Objective:     Maternal Physical Lactation Exam  General: No acute distress  Breasts: Asymmetric  and Soft  Nipples: intact  Psychiatric: Normal mood and affect. Her behavior is normal. Judgment and thought content normal.  Mental Health: Did NOT exhibit sadness, crying, feeling overwhelmed, agitation or hypervigilance.    Assessment/Plan & Lactation Counseling:     Infant Exam on Infant Chart    Infant Weight History:   12/03/2023: 7# 5.8oz  12/15/2023: 7# 3oz (Ped)  12/19/2023: 7# 11oz (Ped)   01/03/2024: 8# 12.1oz    Infant Intake at Breast:   R   20mls   R   40mls    Total: 60mls  Milk Transfer at this feeding:   Effective  breastfeeding, fed approximately 1.5 hours prior to appointment      Pumped: Not indicated   Initiation of Feeding: Infant initiates  Position of Feeding:    Right: cross cradle  Left: cross cradle  Attachment Achieved: rapidly  Nipple shield: N/A       Suck Pattern at the Breast: Suck burst and normal rest  Suck Pattern on the Bottle: Not Indicated     Behavior Following Observed Feeding: content  Nipple Pain: Yes, with initial latch  Nipple Pain From:Nipple damage from accumulated microtrauma which lowered failure strength resulting in sudden damage     Latch: Assisted latch  Suckling/Feeding: attaches, audible swallows, baby falls asleep, baby fed effectively, baby roots, elicits KIMBERLYN, intermittent swallows, and rhythmic  Sucking strength: Moderate Strong  Sucking Rhythm Coordinated   Compression: WNL    Once latched, baby fell into a mature and fully integrated SSB pattern.    Swallowing No difficulty noted  If functional feeding, it is quiet, rhythmic, coordinated, organized, effeicent safe, satisfying and pleasurable for both parent and baby? Yes, mostly   Milk Supply Available: normal +      MATERNAL PERSONALIZED BREASTFEEDING PLAN  Discussed concerns and symptoms as listed above in assessment and guidance summarized below. Shared decision making was used between myself and the family for this encounter, home care, and follow up. Topics reviewed included:   4th Trimester: The 12-week period immediately after mom has had the baby. Not everyone has heard of it, but every mother and their  baby will go through it. It is a time of great physical and emotional change as the baby adjusts to being outside the womb, and the family adjusts to new life as parents  During the fourth trimester, one can expect fussiness and crying from the baby and very likely exhaustion for the family. [place] babies are learning to adjust to life outside the womb where it was warm and squishy!  There is a lot of misinformation  "about babies and their needs, and parents are often encouraged to ignore baby's signals. Bad idea. Babies are “half-baked” at birth and have much to learn with the help of physical and emotional support from caregivers. Taking care of a baby's needs is an investment that pays off with a happier, healthier child and adult.  It can take weeks or even months for your body to feel totally normal again. There is a major hormonal upheaval experienced by moms in the first few weeks after birth, because their bodies are shifting from many pregnancy hormones to a more normal hormonal make-up.  These first three months with baby earth side is a delicate time. Honor it with a mindful dose of support. Mindful Mamma's is an andrews that may help.   Self-Compassion through Relational Support and Interaction.   Be kind to ourself. This is the first step in reducing anxiety and depression. Pay attention to how you are doing.   What do you need? Food, Rest, Sleep, Support, to Laugh/giggle: who will you ask today? They want to help you. We want to help you.   How do you stop your self-blame, or your self-criticism?   Get out of the house each day, walk or drive somewhere or ask a friend to drive you,  a \"treat\" at a drive through.   Mood and Anxiety Disorders: Having a new baby can be joyful time for some new moms, but a difficult time for others. For all, it is a time of profound physical and emotional change. Balancing baby, family, partners and friends, work, pets, and preexisting or new life stressors as well as sleep deprivation can be extremely challenging. Untreated depression, sleep exhaustion, and anxiety are each a challenge that must be addressed and in addition can contribute to early cessation of breastfeeding. It is important both for the mental health and physical health of new moms and babies to get on the path to feeling better and if therapeutic support is needed, specific resources are available. "   Sleep or Lack of: Human Giver Syndrome (moms) tells us it’s “self-indulgent” to rest and sleep, which makes as much sense as believing it’s weak or self indulgent to breathe. We discussed strategies to manage restorative sleep, although short amounts, significant to the mental health of the mother. The principle follows:  Mom goes to sleep right after 8pm +/- feeding  Partner covers first late evening feeding (10pm/11pm), settles baby,  then goes to bed  Mom covers next feeding 1am /2am, partner sleeps  Next feeding share  Milk Supply is dependent on glandular tissue development, hormonal influences, how many times the baby removes milk and how well the breasts are emptied in a 24 hour period. This is a biological reality that we can NOT work around. If, for any reason, your baby is not latching, or you are not able to nurse, then it is important for you to remove the milk instead by pumping or hand expression.  There's no magic trick, tea, food, drink, cookie or supplement that will increase your milk supply. One  must  effectively remove milk to continue to make and maximize milk. In the early days and weeks that can be 8+ times in 24 hours. For older babies, on average 6-7 + times in 24 hours.    Hydration: Staying hydrated is important however lack of hydration is usually not a cause of significantly low milk production.  Everyone needs a different amount of water, depending on their activity and diet. A high salt and/or high-protein diet, high physical activity, or very warm weather/sweating will require more fluids. A person eating a diet high in veggies and fruit, with a lack of physical activity will require fewer fluids. There is no magic number for the # of ounces of water each day.The best way to know that you are well hydrated is by looking at your urine.  Urine should look clear to light pale yellow, and you should need to pee at least every 3 to 4 hours unless you have a large bladder  capacity.  Herbs and Medications: A galactagogue is an herb or medication taken by a breastfeeding mother to increase her milk supply. We know that for centuries mothers around the world have sought out remedies to increase their milk supply. However, there is limited research on the safety and effectiveness of herbal galactagogues, which makes it hard for us to endorse them. It is not known if any of these herbs are truly effective, and it is difficult to predict how a specific herbal galactagogue will affect an individual, requiring “trial and error” in many situations. When effective, results are generally seen within 24-72 hours of starting an herbal galactagogue. Many of these herbs are used to decrease high blood sugar. If you are diabetic or have problems with low blood sugar, or thyroid disease you may not be a candidate for herbs. Not all women can increase their low milk supply with a galactagogue due to the many underlying causes of low milk production.  When taking a galactagogue, remember that frequent milk removal is still the most effective way to increase supply.   Feeding:   Infant feeding well given current interval growth, guidelines to follow:  Feed your baby every 1.5-3 hours, more often if baby acts hungry.   Awaken baby for feeding if going over 3 hours in the day.   No need to wake Home for nighttime feedings.   Daily goal: 8-10 feedings per 24 hours.   Supplement:   No supplement is needed  Can offer replacement bottles as desired, 2.5-3.5oz   Pacing the feeding:  A slow flow nipple helps, but how you feed the baby is more important.  How you are  positioning the bottle can compensate for a faster flow nipple.  When bottle-feeding, the baby should control how much is consumed at a feeding.  Holding the baby in an upright position with the bottle horizontal ensures that the baby gets milk only when sucking.  Here is a nice video demonstrating this concept of paced bottle feeding,   https://www.youSegundoHogarube.com/watch?v=TtWMU7iGG4I Think baby led, not parent led.  Pacifier Use:  The American Academy of Pediatrics' Position Paper reports: Although we recommend a conservative approach regarding pacifier use, we do not endorse a complete ban on the use of pacifiers, nor do we support an approach that induces parental guilt concerning their choices about the use of pacifiers. Pacifier use and breastfeeding in term and  newborns- a systematic review and meta-analysis from the  J of Pediatrics Published online 2022. Has found that when pacifiers are used among individuals who have been counseled on the risks, do not interfere with breastfeeding exclusivity or duration. These are parental choices.   Nipple shield (NS): Use if needed and helpful.   Positioning Techniques   Pillow used: Tangela Fernández  Suggested positions Cross cradle  Fine tune position by making sure your fingers beneath the breast as well as your bra, are out of the way of your baby's chin.  Positioning: See http://globalhealthmedia.org/portfolio-items/positions-for-breastfeeding/?redkinznaPW=39029  Latch on Techniques   Aim is an asymmetric deep latch.  First make yourself comfortable. Sit with knees bent (unless lying down), feet on a stool if needed. You will support your baby, and pillows will be used to support YOU. You want your baby's belly facing your breast/body (belly to belly). If your baby is extremely fussy and crying, do skin-to-skin for a while until he or she calms down, then try (or try again).   Fine tune latch:  By holding your baby more securely at the breast, assisting your baby to stay attached by:  Support your baby with your hand behind the shoulder blades (NOT THE HEAD).  1. Align your baby's NOSE with your nipple  2. Your baby's chin should be the first part of his or her body to touch your breast  3. Tickle the baby's upper lip or nose with your nipple  4. When your baby's mouth is  "WIDE OPEN, swiftly bring your baby's mouth over your nipple/areola.  Steps 2 and 3 could be slightly reversed order or essentially happening at the same time.  Bringing your baby to your breast, not breast to the baby  Your baby's cheek to touch breast securely, nose tipped back  Hold your baby firmly in place so when your baby forgets to suck and picks it back up again your baby is in the correct spot. You will be extinguishing behavior and replacing it with a deeper latch to stimulate suck and provide satisfaction at the breast.  Your baby needs as much breast as deep in the mouth as possible to allow your nipples to heal and for you more importantly to maximize efficiency at the breast  If that doesn’t help, you should make an appointment with me to re-evaluate.   Latch is asymmetrical, leading with the chin, getting the baby below the breast, as if offering a large \"deli tali sandwich\".  Here is a video from [employer] on the asymmetric latch       https://www.[employer].com/watch?v=0I-WIz5Ik16  Pumping Guidelines:  Both breasts 1-2x in 24 hours  Bra    Consider a hands free bra - Simple Wishes is recommended.  Type of Pump:  Medela, Spectra, and Manual  How long will vary woman to woman, typically 8-15 minutes, or 1-2 minutes after flow slows  Flange Fit: Freely moving nipple in the tunnel with some movement of the areola.  Caution with Breast Massage: The word “Massage” means different things in the breastfeeding world. It is described and interpreted in so many different ways and unfortunately potentially harmful. The hands can be safe on a breast and  gentle to help in many ways but they also can be damaging when used in the wrong way.  Lymphatic drainage massage is appropriate,  open hand , lightly stroking only, and can be highly effective. The massage advice to knead , massage deeply , vibrate with marketed tools is  most concerning. Be gentle with this gland to not increase inflammation.   Storage (Acceptable " guidelines for healthy term babies)  10 hours at room temp including your pieces, may rinse but not mandatory  8 days refrigerator, don't need  to refrigerate right away if using fresh pumped milk at the next feeding  Freezer 1 year  Deep freezer 2 years  American Academy or Pediatrics has said you may mix different temperature milks.   If baby drinks breast milk from a fresh or refrigerated bottle of breast milk,  you may return the unused portion to the refrigerator and use once at next feeding.   Nipple care:    May apply breastmilk  You may want to remove baby from breast when active swallowing stops to avoid prolonged nipple compressionMedela Hydrogels, Lansinoh Soothies and Ameda comfort gels are all the same, different companies  Answers to FAQs: https://www.inf[employer]ell [employer].com/category/breastfeeding  Alcohol & Breastfeeding: What's your time-to-zero?  Cough & Cold Medications while Breastfeeding  Vitamin D Supplementation and Breastfeeding  Antidepressant Use While Breastfeeding: What should I know?  Breastfeeding, Caffeine, and Energy Drinks  Recommended resources:  Physicians guide to breastfeeding, must up to date and accurate information available on breastfeeding. https://physicianguidetobreastfeeding.org/  Dads  Step #1 (for Partners): If possible, arrange your life so you can engage with your baby early and often                                                                                 Step #2 (for Moms): Encourage your partner to be hands-on - and let him handle things differently.                                                                                            Step #3 (for Partners): Realize that “your way” of parenting is essential for your child. https://doi.org/10.1093/cercor/tsgf625  Connect with other mothers:  Facebook:   [place] Breastfeeds: https://www.BUILD.com/[place].breastfeeds/  Well-Nourished Babies (Private group for questions and support):  https://www.facebook.com/groups/574155427753749/  Breastfeeding Lawn including opportunity to weight your baby and do before and after weights   Spoiler alert!  Parenting can be really hard. There is so much to learn when it comes to caring for small humans.  It can feel lonely and unsettling.  Our group, is a parenting and feeding support group that's all about feeding/growing tristan.   Babies welcome.   Questions expected.   We will help you find your village!    Tuesdays 10am - 11am. Women's Health at [address] [place] [address], [address] [place] [address], 3rd floor conference room  Check your baby's weight, do a feeding and see how your baby is growing, visit with other mothers, plan on a walk or coffee date after group.  Please download Growth: Baby and Child for Apple or Child Growth Tracker for [email or web address] if you would like to  document and follow your baby's growth curve.  Due to space limitations - limit strollers please (New c/section moms please use your stroller).  We would love to have dads stay, but moms won't breastfeed if there are men in the room, sorry.  The room is generally scheduled for another event following group.  Please take all diapers with you   ONLINE SUPPORT GROUPS  Postpartum Support International (PSI) support groups are conducted using a hkyu-pa-cfcd support model and are not intended for those experiencing a mental health crisis. Groups are 90 minutes (1.5 hours) in length. The first ~30 minutes is providing information, education, and establishing group guidelines. The next ~60 minutes is “talk time,” in which group members share and talk with each other. Group members must be present for the group guidelines before joining in the discussion or “talk time.”       In Conclusion:   Managing breastfeeding and milk supply is very dynamic,can be a complex and intimate journey, and is not one size fits all. When obstacles present themselves, it takes confidence, persistence and support. The rights of the child  include optimal nutrition and mothers need help to make informed decisions. You  and your baby have been screened for biological, psychological, and social risk factors that might interfere with achieving your goals.  Support is critical. We want the family thriving not just tolerating life. You are now the focus of our Breastfeeding Medicine team; we are here to support your decisions and vision.     Follow up requires close monitoring in this time sensitive window of opportunity to establish milk supply and facilitate the learning of  breastfeeding.    Please call [phone] our voicemail line or the front office at [phone] to scheduled your next appointment.  Family is encouraged to e-mail or mychart us to update how the plan is working.    A total of 75 minutes, including infant assessment time,  was spent preparing to see the patient, obtaining and reviewing separately obtained history, performing a medically appropriate examination and evaluation, counseling and educating the family, referring and communicating with other health care professionals, documenting clinical information in the electronic health record, independently interpreting weighted feeds and infant growth results, communicating these results to the family and care coordination as detailed in the above note.       Anayeli Lubin

## 2024-01-11 ENCOUNTER — NON-PROVIDER VISIT (OUTPATIENT)
Dept: OBGYN | Facility: CLINIC | Age: 34
End: 2024-01-11
Payer: COMMERCIAL

## 2024-01-11 NOTE — PROGRESS NOTES
Summary: Breastfeeding every 1.5-2 hours throughout the day, up to 4 hours at night. Offering both breasts but Home only taking one side for most feedings, averaging 8-16 minutes. Pumping 1-2x daily for relief, yielding up to 3oz. Offering occasional replacement bottles, difficulty finishing 2oz, falling asleep at just over 1oz.   Today: Fed 1.25 hours prior to appointment, right side only for 8 minutes. Latched to the right breast, transferred 44mls in 12 minutes. Slightly fussy until diaper changed and dressed then content to be placed in car seat.   Plan: Breastfeed every 1.5-3 hours throughout the day, no need to wake Home for nighttime feedings. Offer one breast for 2 feedings starting in the early morning then switch sides and offer that breast for 2 feedings. Starting in the late morning/early afternoon alternate breasts at each feeding. Pump untouched breast for relief. Offer replacement bottles as needed or desired, 2.5-3oz, increasing the pace to make bottles a little easier for Home.     Follow up:   Lactation appointment:  2024  Baby 's Provider appointment: 2 Month Well Child Check   Referrals: None    Maternal Diagnosis/Problem:  Sore nipples due to lactation and Lactation Problem  Infant Diagnosis/Problem:  At risk for breastfeeding difficulties    Subjective:     Thong Soto is a 33 y.o. female here for lactation care. She is here today with  her baby, Home .    Concerns:   Maternal: Weight check, Infant feeding evaluation, and Breastfeeding questions   Infant: Baby always seems hungry    HPI:   Pertinent  history: c/section    Mother does not have a history of advanced maternal age, GDM, hypertension prior to pregnancy, GHTN, insulin resistance, multiple gestation, PCOS, thyroid disease, placenta encapsulation, and breast surgery    Mother does have auto immune disease, testing for MS. Common condition(s) that may interfere in milk supply.    Breast changes in  pregnancy: Yes  History of breast surgeries: No    FEEDING HISTORY:    Previous Breastfeeding History: First baby.   Hospital Course: Triple feeding initiated and continued at discharge.   Prior to consultation on 1/3/2024: Breastfeeding every 1.5-3 hours day and night. Offering one breast for most feedings, up to 30 minutes. Pumping 1x daily, mostly with the manual pump, yielding 5-6oz between both breasts. Offering 1 replacement bottle daily, 2-3.5oz.    Currently 1/11/2024: Breastfeeding every 1.5-2 hours throughout the day, up to 4 hours at night. Offering both breasts but Home only taking one side for most feedings, averaging 8-16 minutes. Pumping 1-2x daily for relief, yielding up to 3oz. Offering occasional replacement bottles, difficulty finishing 2oz, falling asleep at just over 1oz.     Both breasts: Yes, mostly alternating at each feeding    Supplement: None, offering occasional replacement bottles   Quantity: 2-3.5oz    Nipple Shield Use: No longer using    Breast Pumping:  Frequency: 1-2x  Quantity Obtained: 3oz  Type of Pump: Medela, Spectra S9 and Manual Pump  Flange size/type:   Wearable: Spectra S9   NO pain with pumping    Maternal ROS:  Constitutional: No fever, chills. Feeling well  Breasts: No soreness of breasts and Soreness of nipples  Psychiatric: Managing ok  Mental Health: No mention of feeling irritable, agitated, angry, overwhelmed, apathetic, appetite changes, exhausted nor having sleep changes outside infant feeds/demands.  Current Outpatient Medications on File Prior to Visit   Medication Sig Dispense Refill    docusate sodium 100 MG Cap Take 100 mg by mouth 2 times a day as needed for Constipation. 60 Capsule 1    ibuprofen (MOTRIN) 800 MG Tab Take 1 Tablet by mouth every 8 hours. 30 Tablet 1     No current facility-administered medications on file prior to visit.      No past medical history on file.      Objective:     Maternal Physical Lactation Exam  General: No acute  distress  Breasts: Asymmetric  and Soft  Nipples: intact  Psychiatric: Normal mood and affect. Her behavior is normal. Judgment and thought content normal.  Mental Health: Did NOT exhibit sadness, crying, feeling overwhelmed, agitation or hypervigilance.    Assessment/Plan & Lactation Counseling:     Infant Exam on Infant Chart    Infant Weight History:   12/03/2023: 7# 5.8oz  12/15/2023: 7# 3oz (Ped)  12/19/2023: 7# 11oz (Ped)   01/03/2024: 8# 12.1oz  01/11/2024: 9# 13.8oz    Infant Intake at Breast:   R  44mls  L  Not Offered    Total: 44mls  Milk Transfer at this feeding:   Effective breastfeeding, fed approximately 1.25 hours prior to appointment and 2nd feeding on right side     Pumped: Not indicated   Initiation of Feeding: Infant initiates  Position of Feeding:    Right: cross cradle  Left: not offered   Attachment Achieved: rapidly  Nipple shield: N/A       Suck Pattern at the Breast: Suck burst and normal rest  Suck Pattern on the Bottle: Not Indicated     Behavior Following Observed Feeding: content  Nipple Pain: Yes, with initial latch  Nipple Pain From:Nipple damage from accumulated microtrauma which lowered failure strength resulting in sudden damage     Latch: Assisted latch  Suckling/Feeding: attaches, audible swallows, baby falls asleep, baby fed effectively, baby roots, elicits KIMBERLYN, intermittent swallows, and rhythmic  Sucking strength: Moderate Strong  Sucking Rhythm Coordinated   Compression: WNL    Once latched, baby fell into a mature and fully integrated SSB pattern.    Swallowing No difficulty noted  If functional feeding, it is quiet, rhythmic, coordinated, organized, effeicent safe, satisfying and pleasurable for both parent and baby? Yes, mostly   Milk Supply Available: normal +      MATERNAL PERSONALIZED BREASTFEEDING PLAN  Discussed concerns and symptoms as listed above in assessment and guidance summarized below. Shared decision making was used between myself and the family for this  encounter, home care, and follow up. Topics reviewed included:   Feeding:   Infant feeding well given current interval growth, guidelines to follow:  Feed your baby every 1.5-3 hours, more often if baby acts hungry.   Awaken baby for feeding if going over 3 hours in the day.   No need to wake Home for nighttime feedings.   Daily goal: 8-10 feedings per 24 hours.   Supplement:   No supplement is needed  Can offer replacement bottles as desired, 2.5-3.5oz   Nipple shield (NS): Use if needed and helpful.   Pumping Guidelines:  Untouched breast for relief not to empty   Type of Pump:  Medela, Spectra, and Manual  How long will vary woman to woman, typically 8-15 minutes, or 1-2 minutes after flow slows  Flange Fit: Freely moving nipple in the tunnel with some movement of the areola.  Caution with Breast Massage: The word “Massage” means different things in the breastfeeding world. It is described and interpreted in so many different ways and unfortunately potentially harmful. The hands can be safe on a breast and  gentle to help in many ways but they also can be damaging when used in the wrong way.  Lymphatic drainage massage is appropriate,  open hand , lightly stroking only, and can be highly effective. The massage advice to knead , massage deeply , vibrate with marketed tools is  most concerning. Be gentle with this gland to not increase inflammation.   Storage (Acceptable guidelines for healthy term babies)  10 hours at room temp including your pieces, may rinse but not mandatory  8 days refrigerator, don't need  to refrigerate right away if using fresh pumped milk at the next feeding  Freezer 1 year  Deep freezer 2 years  American Academy or Pediatrics has said you may mix different temperature milks.   If baby drinks breast milk from a fresh or refrigerated bottle of breast milk,  you may return the unused portion to the refrigerator and use once at next feeding.   Nipple care:    May apply breastmilk  You may  want to remove baby from breast when active swallowing stops to avoid prolonged nipple compressionMedela Hydrogels, Lansinoh Soothies and Ameda comfort gels are all the same, different companies  Answers to FAQs: https://www.infantrisk.com/category/breastfeeding  Alcohol & Breastfeeding: What's your time-to-zero?  Cough & Cold Medications while Breastfeeding  Vitamin D Supplementation and Breastfeeding  Antidepressant Use While Breastfeeding: What should I know?  Breastfeeding, Caffeine, and Energy Drinks  Recommended resources:  Physicians guide to breastfeeding, must up to date and accurate information available on breastfeeding. https://physicianguidetobreastfeeding.org/  Dads  Step #1 (for Partners): If possible, arrange your life so you can engage with your baby early and often                                                                                 Step #2 (for Moms): Encourage your partner to be hands-on - and let him handle things differently.                                                                                            Step #3 (for Partners): Realize that “your way” of parenting is essential for your child. https://doi.org/10.1093/cercor/ymyz671  Connect with other mothers:  Facebook:   [place] Breastfeeds: https://www.[email or web address]/[place].breastfeeds/  Well-Nourished Babies (Private group for questions and support): https://www.facebook.com/groups/378991913210675/  Breastfeeding [place] including opportunity to weight your baby and do before and after weights   Spoiler alert!  Parenting can be really hard. There is so much to learn when it comes to caring for small humans.  It can feel lonely and unsettling.  Our group, is a parenting and feeding support group that's all about feeding/growing tristan.   Babies welcome.   Questions expected.   We will help you find your village!    Tuesdays 10am - 11am. [facility] at [address], [address], 3rd floor conference room  Check your  baby's weight, do a feeding and see how your baby is growing, visit with other mothers, plan on a walk or coffee date after group.  Please download Growth: Baby and Child for Apple or Child Growth Tracker for Androids if you would like to  document and follow your baby's growth curve.  Due to space limitations - limit strollers please (New c/section moms please use your stroller).  We would love to have dads stay, but moms won't breastfeed if there are men in the room, sorry.  The room is generally scheduled for another event following group.  Please take all diapers with you   ONLINE SUPPORT GROUPS  Postpartum Support International (PSI) support groups are conducted using a [id number]-[id number] support model and are not intended for those experiencing a mental health crisis. Groups are 90 minutes (1.5 hours) in length. The first ~30 minutes is providing information, education, and establishing group guidelines. The next ~60 minutes is “talk time,” in which group members share and talk with each other. Group members must be present for the group guidelines before joining in the discussion or “talk time.”         Follow up requires close monitoring in this time sensitive window of opportunity to establish milk supply and facilitate the learning of  breastfeeding.    Please call [phone] our voicemail line or the front office at [phone] to scheduled your next appointment.  Family is encouraged to e-mail or mychart us to update how the plan is working.    A total of 60 minutes, including infant assessment time,  was spent preparing to see the patient, obtaining and reviewing separately obtained history, performing a medically appropriate examination and evaluation, counseling and educating the family, referring and communicating with other health care professionals, documenting clinical information in the electronic health record, independently interpreting weighted feeds and infant growth results, communicating these results  to the family and care coordination as detailed in the above note.       Anayeli Lubin

## 2024-01-16 ENCOUNTER — OFFICE VISIT (OUTPATIENT)
Dept: OBGYN | Facility: CLINIC | Age: 34
End: 2024-01-16
Payer: COMMERCIAL

## 2024-01-16 DIAGNOSIS — O92.70 LACTATION PROBLEM: ICD-10-CM

## 2024-01-16 DIAGNOSIS — N64.3 HYPERLACTATION: ICD-10-CM

## 2024-01-16 DIAGNOSIS — O92.29 SORE NIPPLES DUE TO LACTATION: ICD-10-CM

## 2024-01-16 PROCEDURE — G2212 PROLONG OUTPT/OFFICE VIS: HCPCS | Performed by: NURSE PRACTITIONER

## 2024-01-16 PROCEDURE — 99215 OFFICE O/P EST HI 40 MIN: CPT | Performed by: NURSE PRACTITIONER

## 2024-01-16 NOTE — PROGRESS NOTES
Summary: Breastfeeding every 1.5-2 hours throughout the day, up to 4 hours at night. Offering both breasts but Home only taking one side for most feedings, averaging 8-16 minutes. Pumping 1-2x daily for relief, yielding up to 3oz. Offering occasional replacement bottles, difficulty finishing 2oz, falling asleep at just over 1oz.   Today: Fed 1.25 hours prior to appointment, right side only for 8 minutes. Latched to the right breast, transferred 44mls in 12 minutes. Slightly fussy until diaper changed and dressed then content to be placed in car seat.   Plan: Breastfeed every 1.5-3 hours throughout the day, no need to wake Home for nighttime feedings. Offer one breast for 2 feedings starting in the early morning then switch sides and offer that breast for 2 feedings. Starting in the late morning/early afternoon alternate breasts at each feeding. Pump untouched breast for relief. Offer replacement bottles as needed or desired, 2.5-3oz, increasing the pace to make bottles a little easier for Home.     Follow up:   Lactation appointment:  2024  Baby 's Provider appointment: 2 Month Well Child Check   Referrals: None    Maternal Diagnosis/Problem:  Sore nipples due to lactation and Lactation Problem  Infant Diagnosis/Problem:  At risk for breastfeeding difficulties    Subjective:     Thong Soto is a 33 y.o. female here for lactation care. She is here today with  her baby, Home .    Concerns:   Maternal: Weight check, Infant feeding evaluation, and Breastfeeding questions   Infant: Baby always seems hungry    HPI:   Pertinent  history: c/section    Mother does not have a history of advanced maternal age, GDM, hypertension prior to pregnancy, GHTN, insulin resistance, multiple gestation, PCOS, thyroid disease, placenta encapsulation, and breast surgery    Mother does have auto immune disease, testing for MS. Common condition(s) that may interfere in milk supply.    Breast changes in  pregnancy: Yes  History of breast surgeries: No    FEEDING HISTORY:    Previous Breastfeeding History: First baby.   Hospital Course: Triple feeding initiated and continued at discharge.   Prior to consultation on 1/3/2024: Breastfeeding every 1.5-3 hours day and night. Offering one breast for most feedings, up to 30 minutes. Pumping 1x daily, mostly with the manual pump, yielding 5-6oz between both breasts. Offering 1 replacement bottle daily, 2-3.5oz.    Prior to consultation on 1/11/2024: Breastfeeding every 1.5-2 hours throughout the day, up to 4 hours at night. Offering both breasts but Home only taking one side for most feedings, averaging 8-16 minutes. Pumping 1-2x daily for relief, yielding up to 3oz. Offering occasional replacement bottles, difficulty finishing 2oz, falling asleep at just over 1oz.   Currently 1/16/2024:     Both breasts: Yes, mostly alternating at each feeding    Supplement: None, offering occasional replacement bottles   Quantity: 2-3.5oz    Nipple Shield Use: No longer using    Breast Pumping:  Frequency: 1-2x  Quantity Obtained: 3oz  Type of Pump: Medela, Spectra S9 and Manual Pump  Flange size/type:   Wearable: Spectra S9   NO pain with pumping    Maternal ROS:  Constitutional: No fever, chills. Feeling well  Breasts: No soreness of breasts and Soreness of nipples  Psychiatric: Managing ok  Mental Health: No mention of feeling irritable, agitated, angry, overwhelmed, apathetic, appetite changes, exhausted nor having sleep changes outside infant feeds/demands.  Current Outpatient Medications on File Prior to Visit   Medication Sig Dispense Refill    docusate sodium 100 MG Cap Take 100 mg by mouth 2 times a day as needed for Constipation. 60 Capsule 1    ibuprofen (MOTRIN) 800 MG Tab Take 1 Tablet by mouth every 8 hours. 30 Tablet 1     No current facility-administered medications on file prior to visit.      No past medical history on file.      Objective:     Maternal Physical  Lactation Exam  General: No acute distress  Breasts: Asymmetric  and Soft  Nipples: intact  Psychiatric: Normal mood and affect. Her behavior is normal. Judgment and thought content normal.  Mental Health: Did NOT exhibit sadness, crying, feeling overwhelmed, agitation or hypervigilance.    Assessment/Plan & Lactation Counseling:     Infant Exam on Infant Chart    Infant Weight History:   12/03/2023: 7# 5.8oz  12/15/2023: 7# 3oz (Ped)  12/19/2023: 7# 11oz (Ped)   01/03/2024: 8# 12.1oz  01/11/2024: 9# 13.8oz  01/16/2024:    Infant Intake at Breast:   R  44mls  L  Not Offered    Total: 44mls  Milk Transfer at this feeding:   Effective breastfeeding, fed approximately 1.25 hours prior to appointment and 2nd feeding on right side     Pumped: Not indicated   Initiation of Feeding: Infant initiates  Position of Feeding:    Right: cross cradle  Left: not offered   Attachment Achieved: rapidly  Nipple shield: N/A       Suck Pattern at the Breast: Suck burst and normal rest  Suck Pattern on the Bottle: Not Indicated     Behavior Following Observed Feeding: content  Nipple Pain: Yes, with initial latch  Nipple Pain From:Nipple damage from accumulated microtrauma which lowered failure strength resulting in sudden damage     Latch: Assisted latch  Suckling/Feeding: attaches, audible swallows, baby falls asleep, baby fed effectively, baby roots, elicits KIMBERLYN, intermittent swallows, and rhythmic  Sucking strength: Moderate Strong  Sucking Rhythm Coordinated   Compression: WNL    Once latched, baby fell into a mature and fully integrated SSB pattern.    Swallowing No difficulty noted  If functional feeding, it is quiet, rhythmic, coordinated, organized, effeicent safe, satisfying and pleasurable for both parent and baby? Yes, mostly   Milk Supply Available: normal +      MATERNAL PERSONALIZED BREASTFEEDING PLAN  Discussed concerns and symptoms as listed above in assessment and guidance summarized below. Shared decision making was  used between myself and the family for this encounter, home care, and follow up. Topics reviewed included:   Feeding:   Infant feeding well given current interval growth, guidelines to follow:  Feed your baby every 1.5-3 hours, more often if baby acts hungry.   Awaken baby for feeding if going over 3 hours in the day.   No need to wake Home for nighttime feedings.   Daily goal: 8-10 feedings per 24 hours.   Supplement:   No supplement is needed  Can offer replacement bottles as desired, 2.5-3.5oz   Nipple shield (NS): Use if needed and helpful.   Pumping Guidelines:  Untouched breast for relief not to empty   Type of Pump:  Medela, Spectra, and Manual  How long will vary woman to woman, typically 8-15 minutes, or 1-2 minutes after flow slows  Flange Fit: Freely moving nipple in the tunnel with some movement of the areola.  Caution with Breast Massage: The word “Massage” means different things in the breastfeeding world. It is described and interpreted in so many different ways and unfortunately potentially harmful. The hands can be safe on a breast and  gentle to help in many ways but they also can be damaging when used in the wrong way.  Lymphatic drainage massage is appropriate,  open hand , lightly stroking only, and can be highly effective. The massage advice to knead , massage deeply , vibrate with marketed tools is  most concerning. Be gentle with this gland to not increase inflammation.   Storage (Acceptable guidelines for healthy term babies)  10 hours at room temp including your pieces, may rinse but not mandatory  8 days refrigerator, don't need  to refrigerate right away if using fresh pumped milk at the next feeding  Freezer 1 year  Deep freezer 2 years  American Academy or Pediatrics has said you may mix different temperature milks.   If baby drinks breast milk from a fresh or refrigerated bottle of breast milk,  you may return the unused portion to the refrigerator and use once at next feeding.    Nipple care:    May apply breastmilk  You may want to remove baby from breast when active swallowing stops to avoid prolonged nipple compressionMedela Hydrogels, Lansinoh Soothies and Ameda comfort gels are all the same, different companies  Answers to FAQs: https://www.infantrisk.com/category/breastfeeding  Alcohol & Breastfeeding: What's your time-to-zero?  Cough & Cold Medications while Breastfeeding  Vitamin D Supplementation and Breastfeeding  Antidepressant Use While Breastfeeding: What should I know?  Breastfeeding, Caffeine, and Energy Drinks  Recommended resources:  Physicians guide to breastfeeding, must up to date and accurate information available on breastfeeding. https://physicianguidetobreastfeeding.org/  Dads  Step #1 (for Partners): If possible, arrange your life so you can engage with your baby early and often                                                                                 Step #2 (for Moms): Encourage your partner to be hands-on - and let him handle things differently.                                                                                            Step #3 (for Partners): Realize that “your way” of parenting is essential for your child. https://doi.org/10.1093/cercor/ywkw917  Connect with other mothers:  Facebook:   [place] Breastfeeds: [email or web address].[email or web address]/  Well-Nourished Babies (Private group for questions and support): https://www.facebook.com/groups/622204294629823/  Breastfeeding [place] including opportunity to weight your baby and do before and after weights   Spoiler alert!  Parenting can be really hard. There is so much to learn when it comes to caring for small humans.  It can feel lonely and unsettling.  Our group, is a parenting and feeding support group that's all about feeding/growing [place].   Babies welcome.   Questions expected.   We will help you find your village!    Tuesdays 10am - 11am. [facility] at [facility], [address], 3rd floor conference room  Check your baby's weight, do a feeding and see how your baby is growing, visit with other mothers, plan on a walk or coffee date after group.  Please download Growth: Baby and Child for Apple or Child Growth Tracker for [employer] if you would like to  document and follow your baby's growth curve.  Due to space limitations - limit strollers please (New c/section moms please use your stroller).  We would love to have dads stay, but moms won't breastfeed if there are men in the room, sorry.  The room is generally scheduled for another event following group.  Please take all diapers with you   ONLINE SUPPORT GROUPS  Postpartum Support International (PSI) support groups are conducted using a htbs-lg-sbbj support model and are not intended for those experiencing a mental health crisis. Groups are 90 minutes (1.5 hours) in length. The first ~30 minutes is providing information, education, and establishing group guidelines. The next ~60 minutes is “talk time,” in which group members share and talk with each other. Group members must be present for the group guidelines before joining in the discussion or “talk time.”         Follow up requires close monitoring in this time sensitive window of opportunity to establish milk supply and facilitate the learning of  breastfeeding.    Please call [phone] our voicemail line or the front office at [phone] to scheduled your next appointment.  Family is encouraged to e-mail or mychart us to update how the plan is working.    A total of 60 minutes, including infant assessment time,  was spent preparing to see the patient, obtaining and reviewing separately obtained history, performing a medically appropriate examination and evaluation, counseling and educating the family, referring and communicating with other health care professionals, documenting clinical information in the electronic health record, independently interpreting weighted feeds and  infant growth results, communicating these results to the family and care coordination as detailed in the above note.       Anayeli Lubin

## 2024-01-16 NOTE — PROGRESS NOTES
Summary: Breastfeeding every 2-3 hours one breast, 2 feedings on one, then next other one for two feedings until the afternoon.  Feeding  7-17 minutes. Two bottles yesterday, usually just 1-2 per day, 2-3oz each time. Reports baby falling off breast after a few minutes. Mom needing to hold her breast as she when she lets go the breast falls out. Pumps once in the morning, this morning removed 8.5 oz in  15 minutes. Will pump breast not being used for relief - 4-5 minutes 1-2oz. Baby spitting up after most feedings, without crying.   Today: Had fed an hour ago.  Independently latched to the left breast, assisted for firmer latch and put burp cloth rolled under the breast for lift. Removed 54 ml and offered it again for 4 minutes, only soothing but high compression creating a blanched nipple.   Plan: Hold baby to the breast more firmly to prevent baby him from falling off the breast. Offering same breast again, he is trustworthy and will let you know if he doesn't or does want more milk. Four hour blocks, can always go to the same breast during that time. Left is the lamb breast it seems. Exercises to facilitate sucking and less use of the lips. Vasospasm best treated with heat and latch adjustment.   Follow up:   Lactation appointment: 24  Baby 's Provider appointment: 2 Month Well Child Check   Referrals: None  Suggested Contacting: Chiropractor for pediatrics    Maternal Diagnosis/Problem:  Lactation Problem, oversupply  Infant Diagnosis/Problem:  At risk for breastfeeding difficulties    Subjective:     Thong Soto is a 33 y.o. female here for lactation care. She is here today with her baby and  (Bijan ).    Concerns:   Maternal: Latch on difficulties , Oversupply , Weight check, Infant feeding evaluation, and Breastfeeding questions   Infant: Baby always seems hungry    HPI:   Pertinent  history: c/section     Mother does not have a history of advanced maternal age, GDM, hypertension prior  to pregnancy, GHTN, insulin resistance, multiple gestation, PCOS, thyroid disease, placenta encapsulation, and breast surgery     Mother does have auto immune disease, testing for MS. Common condition(s) that may interfere in milk supply.     Breast changes in pregnancy: Yes  History of breast surgeries: No     FEEDING HISTORY:    Previous Breastfeeding History: First baby.   Hospital Course: Triple feeding initiated and continued at discharge.   Prior to consultation on 1/3/2024: Breastfeeding every 1.5-3 hours day and night. Offering one breast for most feedings, up to 30 minutes. Pumping 1x daily, mostly with the manual pump, yielding 5-6oz between both breasts. Offering 1 replacement bottle daily, 2-3.5oz.    Prior to consultation on 1/11/2024: Breastfeeding every 1.5-2 hours throughout the day, up to 4 hours at night. Offering both breasts but Home only taking one side for most feedings, averaging 8-16 minutes. Pumping 1-2x daily for relief, yielding up to 3oz. Offering occasional replacement bottles, difficulty finishing 2oz, falling asleep at just over 1oz.   Currently 1/16/2024 Breastfeeding every 2-3 hours one breast, 2 feedings on one, then next other one for two feedings until the afternoon.  Feeding  7-17 minutes. Two bottles yesterday, usually just 1-2 per day, 2-3oz each time. Reports baby falling off breast after a few minutes. Mom needing to hold her breast as she when she lets go the breast falls out. Pumps once in the morning, this morning removed 8.5 oz in  15 minutes. Will pump breast not being used for relief - 4-5 minutes 1-2oz. Baby spitting up after most feedings, without crying.     Both breasts: No longer both breasts each feeding, nursing one breast for 2 feedings.     Supplement: None, offering occasional replacement bottles   Quantity: 2-3.5oz     Nipple Shield Use: No longer using     Breast Pumping:  Frequency: 1x  Quantity Obtained: this morning 8.5oz  Type of Pump: Medela, Spectra  S9 and Manual Pump  Wearable: Spectra S9   NO pain with pumping    Maternal ROS:  Constitutional: No fever, chills. Feeling well  Breasts: No soreness of breasts and No soreness of nipples  Psychiatric: No mood changes and Managing ok  Mental Health: No mention of feeling irritable, agitated, angry, overwhelmed, apathetic, appetite changes, exhausted nor having sleep changes outside infant feeds/demands.  Current Outpatient Medications on File Prior to Visit   Medication Sig Dispense Refill    docusate sodium 100 MG Cap Take 100 mg by mouth 2 times a day as needed for Constipation. 60 Capsule 1    ibuprofen (MOTRIN) 800 MG Tab Take 1 Tablet by mouth every 8 hours. 30 Tablet 1     No current facility-administered medications on file prior to visit.      No past medical history on file.    Objective:     Maternal Physical Lactation Exam  General: No acute distress  Breasts: Asymmetric , Soft, Plugged Duct - no evidence, and Mastitis  - no S/S  Nipples: intact but tender, some blanching after the feeding. Vasospasm after feeding.  Psychiatric: Normal mood and affect. Her behavior is normal. Judgment and thought content normal.  Mental Health: Did NOT exhibit sadness, crying, feeling overwhelmed, agitation or hypervigilance.    Assessment/Plan & Lactation Counseling:     Infant Exam on Infant Chart    Infant Weight History:   12/03/2023: 7# 5.8oz  12/15/2023: 7# 3oz (Ped)  12/19/2023: 7# 11oz (Ped)   01/03/2024: 8# 12.1oz  01/11/2024: 9# 13.8oz  1/16/2024  10#7.5oz    Infant Intake at Breast:   Right breast only offered   Total: 54ml + 2ml = 56ml  Milk Transfer at this feeding:   Effective breastfeeding     Pumped: Not indicated   Initiation of Feeding: Infant initiates  Attachment Achieved: rapidly  Nipple shield: N/A       Difficult Latch Due To:   Position and latch  Suck Pattern at the Breast: Suck burst and normal rest  Suck Pattern on the Bottle: Not Indicated   Behavior Following Observed Feeding: content  Nipple  Pain: Tenderness  Nipple Pain From:Contact forces of the tongue causing nipple strain resulting in damage     Latch: Mom latches independently and Assisted latch  Suckling/Feeding: attaches, baby fed effectively, baby roots, elicits KIMBERLYN, and rhythmic  Sucking strength: Moderate Strong  Sucking Rhythm Coordinated   Compression: WNL    Once latched, baby fell into a mature and fully integrated SSB pattern.    Swallowing No difficulty noted  If functional feeding, it is quiet, rhythmic, coordinated, organized, effeicent safe, satisfying and pleasurable for both parent and baby? Not yet  Milk Supply Available: abundant     MATERNAL PERSONALIZED BREASTFEEDING PLAN  (Babies and parents change and adapt  or mal-adapt, to each other, so a plan today is only as good as it facilitates the families goals, follow up is key in a dynamic process as breastfeeding.)  Discussed concerns and symptoms as listed above in assessment and guidance summarized below. Shared decision making was used between myself and the family for this encounter, home care, and follow up. Topics advised, taught and or reviewed included:   The nature of infants oral head/neck structure and function and its impact on latch and transfer of milk.   Discussed Unilateral neck rotation is a normal  finding that should correct itself over the next few weeks.    However when there is a neck preference it can make latching more difficult.    Infant head can be supported in the car seat.    Bottle feeding baby's can be encouraged to look to the opposite side of the preference  Infant can be can talked to from the side encouraging baby to turn to.   Oral motor function  Mechanical forces resulting in strain patterns during intrauterine life and during birth may negatively affect the oral-motor function of the  and compromise structure and function.  Joint restriction or hypo-mobility could be a logical progression following the relative lack of mobility  during the last crowded months of gestation. An exceptionally flexed or rotated fetal posture may tighten myofascial structures in the neck, restricting the hyoid bone and strap muscles that support an effective swallow. More research reveals the body as an integrated whole via its major structure-maintaining and sensory organ, the fascia.  Other musculoskeletal issues, such as neck preferences, may also affect an infant's ability to nurse. These views have expanded and deepened over time.   Hyperlactation (Oversupply) This is a high rate of milk production often causing breast discomfort and or compelling moms to express beyond what the baby is taking.There is no defined clinical criteria. Infant can present with stopping to suckle/letting go, milk spraying in the baby's face, baby coughing or choking or breast refusal, struggle with initial letdown with gasping, fussiness, rapid weight gain (1# a week), excessive gas and refusing the second breast or breast with larger supply.   Causes:   [place] phenomenon breasts don't respond to feedback of fullness  Large storage capacity  Management  Block feeding  Feed from 1 breast for 4 hour block of time  Noticeable drop in supply by 36-48 hours  If resting side too full, pump MINIMALLY to comfort  Full drainage in the morning  Follow up weight mandatory while deliberately decreasing supply  Medications    Foremilk Hind milk imbalance this is only problematic with high supply. With high supply high lactose load leading to heavy gut osmotic load causing frequent green, mucous stools, stooling during feeding and bothersome gas.   Milk Supply is dependent on glandular tissue development, hormonal influences, how many times the baby removes milk and how well the breasts are emptied in a 24 hour period. This is a biological reality that we can NOT work around. If, for any reason, your baby is not latching, or you are not able to nurse, then it is important for you to remove  "the milk instead by pumping or hand expression.  There's no magic trick, tea, food, drink, cookie or supplement that will increase your milk supply. One  must  effectively remove milk to continue to make and maximize milk. In the early days and weeks that can be 8+ times in 24 hours. For older babies, on average 6-7 + times in 24 hours.    Feeding:   Infant feeding well given current interval growth, guidelines to follow:  Feed your baby every 1.5-3 hours, more often if baby acts hungry.   Awaken baby for feeding if going over 3 hours in the day.    Daily goal: 8-12 feedings per 24 hours.    Given infants weight you may allow baby to go longer at night but that generally means shorter durations in the day.  Supplement:   No supplement is needed  Strategies to improve efficiency and ability to feed and or enhance feeding effectiveness and comfort:   Suck training Have baby suck on your clean finger (nail down) pad of finger in the palate  Introduce slowly, avoid gagging.  Press up slightly in the palate and pull finger toward the front of the mouth, not all the way out When baby sucks, use \"tug of war\" to provide resistance.    Sometimes our work is to disrupt old patterns so that new and more functional patterns can be established.  STRATEGIES to reduce tightness and facilitate tongue use    On the bottle;               Tug of war and then pull the lip down to encourage the tongue to cup and move forward  Rhythmic movements for tightness  https://www.youtube.com/watch?v=VR_OCKpIhyo  Includes:  Sushi roll for the pelvis (tight hips tight lips),  Longitudinal rocking  Ribcage rocking  Side lying rocking, if one side, like algebra, do the other side  Pacing the feeding:  A slow flow nipple helps, but how you feed the baby is more important.  How you are  positioning the bottle can compensate for a faster flow nipple.  When bottle-feeding, the baby should control how much is consumed at a feeding.  Holding the baby in " an upright position with the bottle horizontal ensures that the baby gets milk only when sucking.  Here is a nice video demonstrating this concept of paced bottle feeding,  https://www.youtube.com/watch?v=MsWDL5rQM8Z Think baby led, not parent led.  Pacifier Use:  The American Academy of Pediatrics' Position Paper reports: Although we recommend a conservative approach regarding pacifier use, we do not endorse a complete ban on the use of pacifiers, nor do we support an approach that induces parental guilt concerning their choices about the use of pacifiers. Pacifier use and breastfeeding in term and  newborns- a systematic review and meta-analysis from the  J of Pediatrics Published online 2022. Has found that when pacifiers are used among individuals who have been counseled on the risks, do not interfere with breastfeeding exclusivity or duration. This is a  parental choice.   Positioning Techniques for Bare Breast  Pillow used: Tangela Fernández  Suggested positions Cross cradle and Football  Fine tune position by making sure your fingers beneath the breast as well as your bra, are out of the way of your baby's chin.  Positioning: See http://globalhealthmedia.org/portfolio-items/positions-for-breastfeeding/?bsjqiiojhSJ=30170  Latch on Techniques for Bare Breast.   Aim is an asymmetric deep latch.  First make yourself comfortable. Sit with knees bent (unless lying down), feet on a stool if needed. You will support your baby, and pillows will be used to support YOU. You want your baby's belly facing your breast/body (belly to belly). If your baby is extremely fussy and crying, do skin-to-skin for a while until he or she calms down, then try (or try again).   Fine tune latch:  By holding your baby more securely at the breast, assisting your baby to stay attached by:  Support your baby with your hand behind the shoulder blades (NOT THE HEAD).  1. Align your baby's NOSE with your nipple  2.  "Your baby's chin should be the first part of his or her body to touch your breast  3. Tickle the baby's upper lip or nose with your nipple  4. When your baby's mouth is WIDE OPEN, swiftly bring your baby's mouth over your nipple/areola.  Steps 2 and 3 could be slightly reversed order or essentially happening at the same time.  Bringing your baby to your breast, not breast to the baby  Your baby's cheek to touch breast securely, nose tipped back  Hold your baby firmly in place so when your baby forgets to suck and picks it back up again your baby is in the correct spot. You will be extinguishing behavior and replacing it with a deeper latch to stimulate suck and provide satisfaction at the breast.  Your baby needs as much breast as deep in the mouth as possible to allow your nipples to heal and for you more importantly to maximize efficiency at the breast  If that doesn’t help, you should make an appointment with me to re-evaluate.   Latch is asymmetrical, leading with the chin, getting the baby below the breast, as if offering a large \"deli tali sandwich\".  Here is a video from BRISEIDA on the asymmetric latch       https://www.[employer].com/watch?v=0I-YKi5Vk40  Pumping Guidelines:  Both breasts 1 times in 24 hours  Type of Pump:  Untouched breast for relief not to empty   Medela, Spectra, and Manual  How long will vary woman to woman, typically 8-15 minutes, or 1-2 minutes after flow slows  Flange Fit: Freely moving nipple in the tunnel with some movement of the areola.  Caution with Breast Massage: The word “Massage” means different things in the breastfeeding world. It is described and interpreted in so many different ways and unfortunately potentially harmful. The hands can be safe on a breast and  gentle to help in many ways but they also can be damaging when used in the wrong way.  Lymphatic drainage massage is appropriate,  open hand , lightly stroking only, and can be highly effective. The massage advice to " knead , massage deeply , vibrate with marketed tools is  most concerning. Be gentle with this gland to not increase inflammation.   Storage (Acceptable guidelines for healthy term babies)  10 hours at room temp including your pieces, may rinse but not mandatory  8 days refrigerator, don't need  to refrigerate right away if using fresh pumped milk at the next feeding  Freezer 1 year  Deep freezer 2 years  American Academy or Pediatrics has said you may mix different temperature milks.   If baby drinks breast milk from a fresh or refrigerated bottle of breast milk,  you may return the unused portion to the refrigerator and use once at next feeding.   Nipple care:    May apply breastmilk  Lanolin contraindicated with wool sensitivity  You do not need to wash this off before pumping or feeding the baby  You may want to remove baby from breast when active swallowing stops to avoid prolonged nipple compression  Maternal nipple Vasospasm  I would definitely try heat, although heat does not always work with vasospasm. When I have seen intense pain on one side like this, in the over producing breast.  Think vasospasm, there is research from the 1980’s that demonstrates vasoconstriction of the blood vessels when the alveoli are stretched. The authors describe this as an adaptation to decrease flow of nutrient substrates to the alveoli. In other words, the maternal capillaries around the alveoli narrow to decrease transfer of nutrients to the alveoli, in order to decrease milk production.   I see vasospasm as a secondary phenomenon, you have to identify the primary issue that is causing this microedema and treat that primary issue--the hyperlactation, the nipple injury to get rid of the primary cause of the vasospasm, while treating the vasospasm itself symptomatically-.  Start with heat and latch and we can do other things if not resolved.   Answers to FAQs: https://www.infantrisk.com/category/breastfeeding  Alcohol &  Breastfeeding: What's your time-to-zero?  Cough & Cold Medications while Breastfeeding  Vitamin D Supplementation and Breastfeeding  Antidepressant Use While Breastfeeding: What should I know?  Breastfeeding, Caffeine, and Energy Drinks  Recommended resources:  Physicians guide to breastfeeding, must up to date and accurate information available on breastfeeding. https://physicianguidetobreastfeeding.org/  Connect with other mothers:  Facebook:   [place] Breastfeeds: https://www.[employer].com/[place].breastfeeds/  Well-Nourished Babies (Private group for questions and support): https://www.[employer].com/groups/336654013621007/  Breastfeeding [place] including opportunity to weight your baby and do before and after weights   Spoiler alert!  Parenting can be really hard. There is so much to learn when it comes to caring for small humans.  It can feel lonely and unsettling.  Our group, is a parenting and feeding support group that's all about feeding/growing tristan.   Babies welcome.   Questions expected.   We will help you find your village!    Tuesdays 10am - 11am. Women's Health at [address] [place] [address], [facility] [address] [place] [address], 3rd floor conference room  Check your baby's weight, do a feeding and see how your baby is growing, visit with other mothers, plan on a walk or coffee date after group.  Please download Growth: Baby and Child for [employer] or Child Growth Tracker for [employer] if you would like to  document and follow your baby's growth curve.  Due to space limitations - limit strollers please (New c/section moms please use your stroller).  We would love to have dads stay, but moms won't breastfeed if there are men in the room, sorry.  The room is generally scheduled for another event following group.  Please take all diapers with you   ONLINE SUPPORT GROUPS  Postpartum [employer] (PSI) support groups are conducted using a gdcl-fd-fqsw support model and are not intended for those experiencing a mental health crisis. Groups  are 90 minutes (1.5 hours) in length. The first ~30 minutes is providing information, education, and establishing group guidelines. The next ~60 minutes is “talk time,” in which group members share and talk with each other. Group members must be present for the group guidelines before joining in the discussion or “talk time.”   In Conclusion:   Managing breastfeeding and milk supply is very dynamic,can be a complex and intimate journey, and is not one size fits all. When obstacles present themselves, it takes confidence, persistence and support. The rights of the child include optimal nutrition and mothers need help to make informed decisions. You  and your baby have been screened for biological, psychological, and social risk factors that might interfere with achieving your goals.  Support is critical. We want the family thriving not just tolerating life. You are now the focus of our Breastfeeding Medicine team; we are here to support your decisions and vision.     Follow up   Please call [phone] our voicemail line or the front office at [phone] to scheduled your next appointment.  Family is encouraged to e-mail (for Justice, may use [email or web address]) or [email or web address]  to update how the plan is working.     A total of 90 minutes, not including infant assessment time,  was spent preparing to see the patient, obtaining and reviewing separately obtained history, performing a medically appropriate examination and evaluation, counseling and educating the family, communicating with other health care professionals, documenting clinical information in the electronic health record, independently interpreting weighted feeds and infant growth results, communicating these results to the family and care coordination as detailed in the above note.       MARISA Hinojosa.

## 2024-01-22 ENCOUNTER — OFFICE VISIT (OUTPATIENT)
Dept: OBGYN | Facility: CLINIC | Age: 34
End: 2024-01-22
Payer: COMMERCIAL

## 2024-01-22 DIAGNOSIS — N64.4 BREAST PAIN: ICD-10-CM

## 2024-01-22 DIAGNOSIS — O92.70 LACTATION PROBLEM: ICD-10-CM

## 2024-01-22 PROCEDURE — 99215 OFFICE O/P EST HI 40 MIN: CPT | Performed by: NURSE PRACTITIONER

## 2024-01-22 NOTE — PROGRESS NOTES
Summary: Breastfeeding every 1.5-2 hours during the day, up to 3.5-4 hours at night. Offering one breast for 4 hour blocks before switching sides. Pumping untouched breast as needed for relief, yielding 1.5-2.5oz within 5 minutes.   Today: Latched to the right breast for the first feeding. Home transferred 52mls in 11 minutes. Content to be dressed. Offered the left, transferred 50mls in 6 minutes. Content to be held then placed in car seat.   Plan: Breastfeed frequently throughout the day, every 1.5-3 hours, no need to wake Home for nighttime feedings. Offer the left breast for 3-4 feedings before switching to the right breast. Pump untouched breast for relief, not to empty. Can pump in place of breastfeeding and offer bottle as desired.      Follow up:   Lactation appointment: 2024  Baby 's Provider appointment: 2 Month Well Child Check   Referrals: None  Suggested Contacting: Chiropractor for pediatrics    Maternal Diagnosis/Problem:  Lactation Problem, oversupply and breast pain   Infant Diagnosis/Problem:  At risk for breastfeeding difficulties    Subjective:     Thong Soto is a 33 y.o. female here for lactation care. She is here today with her baby and  (Bijan ).    Concerns:   Maternal: Latch on difficulties , Oversupply , Weight check, Infant feeding evaluation, and Breastfeeding questions   Infant: Baby always seems hungry    HPI:   Pertinent  history: c/section     Mother does not have a history of advanced maternal age, GDM, hypertension prior to pregnancy, GHTN, insulin resistance, multiple gestation, PCOS, thyroid disease, placenta encapsulation, and breast surgery     Mother does have auto immune disease, testing for MS. Common condition(s) that may interfere in milk supply.     Breast changes in pregnancy: Yes  History of breast surgeries: No     FEEDING HISTORY:    Previous Breastfeeding History: First baby.   Hospital Course: Triple feeding initiated and continued at  discharge.   Prior to consultation on 1/3/2024: Breastfeeding every 1.5-3 hours day and night. Offering one breast for most feedings, up to 30 minutes. Pumping 1x daily, mostly with the manual pump, yielding 5-6oz between both breasts. Offering 1 replacement bottle daily, 2-3.5oz.    Prior to consultation on 1/11/2024: Breastfeeding every 1.5-2 hours throughout the day, up to 4 hours at night. Offering both breasts but Home only taking one side for most feedings, averaging 8-16 minutes. Pumping 1-2x daily for relief, yielding up to 3oz. Offering occasional replacement bottles, difficulty finishing 2oz, falling asleep at just over 1oz.   Prior to consultation on 1/16/2024 Breastfeeding every 2-3 hours one breast, 2 feedings on one, then next other one for two feedings until the afternoon.  Feeding  7-17 minutes. Two bottles yesterday, usually just 1-2 per day, 2-3oz each time. Reports baby falling off breast after a few minutes. Mom needing to hold her breast as she when she lets go the breast falls out. Pumps once in the morning, this morning removed 8.5 oz in  15 minutes. Will pump breast not being used for relief - 4-5 minutes 1-2oz. Baby spitting up after most feedings, without crying.   Currently 1/22/2024: Breastfeeding every 1.5-2 hours during the day, up to 3.5-4 hours at night. Offering one breast for 4 hour blocks before switching sides. Pumping untouched breast as needed for relief, yielding 1.5-2.5oz within 5 minutes.     Both breasts: No longer both breasts each feeding, nursing one breast for 4 hours     Supplement: None, offering occasional replacement bottles   Quantity: 2-3.5oz     Nipple Shield Use: No longer using     Breast Pumping:  Frequency: 1x  Quantity Obtained: varies   Type of Pump: Medela, Spectra S9 and Manual Pump  Wearable: Spectra S9   NO pain with pumping    Maternal ROS:  Constitutional: No fever, chills. Feeling well  Breasts: No soreness of breasts and No soreness of  nipples  Psychiatric: No mood changes and Managing ok  Mental Health: No mention of feeling irritable, agitated, angry, overwhelmed, apathetic, appetite changes, exhausted nor having sleep changes outside infant feeds/demands.  Current Outpatient Medications on File Prior to Visit   Medication Sig Dispense Refill    docusate sodium 100 MG Cap Take 100 mg by mouth 2 times a day as needed for Constipation. 60 Capsule 1    ibuprofen (MOTRIN) 800 MG Tab Take 1 Tablet by mouth every 8 hours. 30 Tablet 1     No current facility-administered medications on file prior to visit.      No past medical history on file.    Objective:     Maternal Physical Lactation Exam  General: No acute distress  Breasts: Asymmetric , Soft, Plugged Duct - no evidence, and Mastitis  - no S/S  Nipples: intact but tender, some blanching after the feeding. Vasospasm after feeding.  Psychiatric: Normal mood and affect. Her behavior is normal. Judgment and thought content normal.  Mental Health: Did NOT exhibit sadness, crying, feeling overwhelmed, agitation or hypervigilance.    Assessment/Plan & Lactation Counseling:     Infant Exam on Infant Chart    Infant Weight History:   12/03/2023: 7# 5.8oz  12/15/2023: 7# 3oz (Ped)  12/19/2023: 7# 11oz (Ped)   01/03/2024: 8# 12.1oz  01/11/2024: 9# 13.8oz  01/16/2024: 10# 7.5oz  01/22/2024: 11# 2oz    Infant Intake at Breast:   R  52mls   L  50mls   Total: 102mls  Milk Transfer at this feeding:   Effective breastfeeding     Pumped: Not indicated   Initiation of Feeding: Infant initiates  Attachment Achieved: rapidly  Nipple shield: N/A       Difficult Latch Due To:   Position and latch  Suck Pattern at the Breast: Suck burst and normal rest  Suck Pattern on the Bottle: Not Indicated   Behavior Following Observed Feeding: content  Nipple Pain: Tenderness  Nipple Pain From:Contact forces of the tongue causing nipple strain resulting in damage     Latch: Mom latches independently and Assisted  latch  Suckling/Feeding: attaches, baby fed effectively, baby roots, elicits KIMBERLYN, and rhythmic  Sucking strength: Moderate Strong  Sucking Rhythm Coordinated   Compression: WNL    Once latched, baby fell into a mature and fully integrated SSB pattern.    Swallowing No difficulty noted  If functional feeding, it is quiet, rhythmic, coordinated, organized, effeicent safe, satisfying and pleasurable for both parent and baby? Not yet  Milk Supply Available: abundant     MATERNAL PERSONALIZED BREASTFEEDING PLAN  (Babies and parents change and adapt  or mal-adapt, to each other, so a plan today is only as good as it facilitates the families goals, follow up is key in a dynamic process as breastfeeding.)  Discussed concerns and symptoms as listed above in assessment and guidance summarized below. Shared decision making was used between myself and the family for this encounter, home care, and follow up. Topics advised, taught and or reviewed included:   Feeding:   Infant feeding well given current interval growth, guidelines to follow:  Feed your baby every 1.5-3 hours, more often if baby acts hungry.   Awaken baby for feeding if going over 3 hours in the day.    No need to wake Home for nighttime feedings.   Daily goal: 8-12 feedings per 24 hours.   Supplement:   No supplement is needed.  Offer replacement bottles as desired.   Pumping Guidelines:  Both breasts 1 times in 24 hours  Untouched breast for relief not to empty   Type of Pump:  Medela, Spectra, and Manual  How long will vary woman to woman, typically 8-15 minutes, or 1-2 minutes after flow slows  Flange Fit: Freely moving nipple in the tunnel with some movement of the areola.  Nipple care:    May apply breastmilk  Lanolin contraindicated with wool sensitivity  You do not need to wash this off before pumping or feeding the baby  You may want to remove baby from breast when active swallowing stops to avoid prolonged nipple compressionMaternal nipple Vasospasm  I  would definitely try heat, although heat does not always work with vasospasm. When I have seen intense pain on one side like this, in the over producing breast.  Think vasospasm, there is research from the 1980’s that demonstrates vasoconstriction of the blood vessels when the alveoli are stretched. The authors describe this as an adaptation to decrease flow of nutrient substrates to the alveoli. In other words, the maternal capillaries around the alveoli narrow to decrease transfer of nutrients to the alveoli, in order to decrease milk production.   I see vasospasm as a secondary phenomenon, you have to identify the primary issue that is causing this microedema and treat that primary issue--the hyperlactation, the nipple injury to get rid of the primary cause of the vasospasm, while treating the vasospasm itself symptomatically-.  Start with heat and latch and we can do other things if not resolved.   Answers to FAQs: https://www.infantHardMetrics.com/category/breastfeeding  Alcohol & Breastfeeding: What's your time-to-zero?  Cough & Cold Medications while Breastfeeding  Vitamin D Supplementation and Breastfeeding  Antidepressant Use While Breastfeeding: What should I know?  Breastfeeding, Caffeine, and Energy Drinks  Recommended resources:  Physicians guide to breastfeeding, must up to date and accurate information available on breastfeeding. https://physicianguidetobreastfeeding.org/  Connect with other mothers:  Facebook:   [place] [facility]: https://www.[employer].com/[place].breastfeeds/  Well-Nourished Babies (Private group for questions and support): https://www.facebook.com/groups/509438897697946/  Breastfeeding [place] including opportunity to weight your baby and do before and after weights   Spoiler alert!  Parenting can be really hard. There is so much to learn when it comes to caring for small humans.  It can feel lonely and unsettling.  Our group, is a parenting and feeding support group that's all about  feeding/growing tristan.   Babies welcome.   Questions expected.   We will help you find your village!    Tuesdays 10am - 11am. Women's Health at [address] [place] [address], [address], 3rd floor conference room  Check your baby's weight, do a feeding and see how your baby is growing, visit with other mothers, plan on a walk or coffee date after group.  Please download Growth: Baby and Child for Apple or Child Growth Tracker for Androids if you would like to  document and follow your baby's growth curve.  Due to space limitations - limit strollers please (New c/section moms please use your stroller).  We would love to have dads stay, but moms won't breastfeed if there are men in the room, sorry.  The room is generally scheduled for another event following group.  Please take all diapers with you   ONLINE SUPPORT GROUPS  [employer] ([employer]) support groups are conducted using a ohif-ly-xpuw support model and are not intended for those experiencing a mental health crisis. Groups are 90 minutes (1.5 hours) in length. The first ~30 minutes is providing information, education, and establishing group guidelines. The next ~60 minutes is “talk time,” in which group members share and talk with each other. Group members must be present for the group guidelines before joining in the discussion or “talk time.”     Follow up   Please call [phone] our voicemail line or the front office at [phone] to scheduled your next appointment.    A total of 60 minutes, including infant assessment time,  was spent preparing to see the patient, obtaining and reviewing separately obtained history, performing a medically appropriate examination and evaluation, counseling and educating the family, communicating with other health care professionals, documenting clinical information in the electronic health record, independently interpreting weighted feeds and infant growth results, communicating these results to the family and care  coordination as detailed in the above note.       Anayeli Lubin

## 2024-01-30 ENCOUNTER — OFFICE VISIT (OUTPATIENT)
Dept: OBGYN | Facility: CLINIC | Age: 34
End: 2024-01-30
Payer: COMMERCIAL

## 2024-01-30 DIAGNOSIS — O92.29 SORE NIPPLES DUE TO LACTATION: ICD-10-CM

## 2024-01-30 DIAGNOSIS — N64.4 SORENESS BREAST: ICD-10-CM

## 2024-01-30 DIAGNOSIS — O92.70 LACTATION PROBLEM: ICD-10-CM

## 2024-01-30 PROCEDURE — 99215 OFFICE O/P EST HI 40 MIN: CPT | Performed by: NURSE PRACTITIONER

## 2024-01-30 NOTE — PROGRESS NOTES
Summary: Breastfeeding every 1.5-2 hours during the day, up to 3.5-4 hours at night. Offering the left breast for 3-4 feedings, depending on how long he feeds then offering the right breast for 1 feeding. Pumping untouched breast as needed for relief. Reports difficulty giving bottle, both parents.   Today: Brought pumped milk to attempt bottle today. LC offered bottle, Home ok to take it but had difficulty finding it. Pushed nipple to the hard palate and offered chin support. Latched and took with some spilling, finishing approximately 25mls. Then latched to the left breast, cross cradle, transferred 46mls in 8 minutes. Content to be held. Started fussing and fidgety, offered the right breast, transferred 8mls, mostly non-nutritive sucking. Mother attended group after the appointment. Home latched to the left, transferred 32mls.    Plan: Breastfeed frequently throughout the day, every 1.5-3 hours, no need to wake Home for nighttime feedings. Continue to offer the left breast for 3-4 feedings before switching to the right breast. Pump untouched breast for relief, not to empty. Can pump in place of breastfeeding and offer bottle as desired. Encouraged switching to level 2 nipples and offering 1 bottle daily.      Follow up:   Lactation appointment: 2024  Baby 's Provider appointment: 2 Month Well Child Check   Referrals: None  Suggested Contacting: Chiropractor for pediatrics    Maternal Diagnosis/Problem:  Lactation Problem, oversupply, nipple pain and breast pain   Infant Diagnosis/Problem:  At risk for breastfeeding difficulties    Subjective:     Thong Soto is a 33 y.o. female here for lactation care. She is here today with her baby, Home.    Concerns:   Maternal: Latch on difficulties , Oversupply , Weight check, Infant feeding evaluation, and Breastfeeding questions   Infant: Baby always seems hungry    HPI:   Pertinent  history: c/section     Mother does not have a history of  advanced maternal age, GDM, hypertension prior to pregnancy, GHTN, insulin resistance, multiple gestation, PCOS, thyroid disease, placenta encapsulation, and breast surgery     Mother does have auto immune disease, testing for MS. Common condition(s) that may interfere in milk supply.     Breast changes in pregnancy: Yes  History of breast surgeries: No     FEEDING HISTORY:    Previous Breastfeeding History: First baby.   Hospital Course: Triple feeding initiated and continued at discharge.   Prior to consultation on 1/3/2024: Breastfeeding every 1.5-3 hours day and night. Offering one breast for most feedings, up to 30 minutes. Pumping 1x daily, mostly with the manual pump, yielding 5-6oz between both breasts. Offering 1 replacement bottle daily, 2-3.5oz.    Prior to consultation on 1/11/2024: Breastfeeding every 1.5-2 hours throughout the day, up to 4 hours at night. Offering both breasts but Home only taking one side for most feedings, averaging 8-16 minutes. Pumping 1-2x daily for relief, yielding up to 3oz. Offering occasional replacement bottles, difficulty finishing 2oz, falling asleep at just over 1oz.   Prior to consultation on 1/16/2024 Breastfeeding every 2-3 hours one breast, 2 feedings on one, then next other one for two feedings until the afternoon.  Feeding  7-17 minutes. Two bottles yesterday, usually just 1-2 per day, 2-3oz each time. Reports baby falling off breast after a few minutes. Mom needing to hold her breast as she when she lets go the breast falls out. Pumps once in the morning, this morning removed 8.5 oz in  15 minutes. Will pump breast not being used for relief - 4-5 minutes 1-2oz. Baby spitting up after most feedings, without crying.   Prior to consultation on 1/22/2024: Breastfeeding every 1.5-2 hours during the day, up to 3.5-4 hours at night. Offering one breast for 4 hour blocks before switching sides. Pumping untouched breast as needed for relief, yielding 1.5-2.5oz within 5  minutes.   Currently 1/30/2024: Breastfeeding every 1.5-2 hours during the day, up to 3.5-4 hours at night. Offering the left breast for 3-4 feedings, depending on how long he feeds then offering the right breast for 1 feeding. Pumping untouched breast as needed for relief. Reports difficulty giving bottle, both parents.        Supplement: None, offering occasional replacement bottles   Quantity: 2-3.5oz     Nipple Shield Use: No longer using     Breast Pumping:  Frequency: 1x  Quantity Obtained: varies   Type of Pump: Medela, Spectra S9 and Manual Pump  Wearable: Spectra S9   NO pain with pumping    Maternal ROS:  Constitutional: No fever, chills. Feeling well  Breasts: Soreness of breasts and Soreness of nipples, improving   Psychiatric: No mood changes and Managing ok  Mental Health: No mention of feeling irritable, agitated, angry, overwhelmed, apathetic, appetite changes, exhausted nor having sleep changes outside infant feeds/demands.  Current Outpatient Medications on File Prior to Visit   Medication Sig Dispense Refill    docusate sodium 100 MG Cap Take 100 mg by mouth 2 times a day as needed for Constipation. 60 Capsule 1    ibuprofen (MOTRIN) 800 MG Tab Take 1 Tablet by mouth every 8 hours. 30 Tablet 1     No current facility-administered medications on file prior to visit.      No past medical history on file.    Objective:     Maternal Physical Lactation Exam  General: No acute distress  Breasts: Asymmetric , Soft, Plugged Duct - no evidence, and Mastitis  - no S/S  Nipples: intact but tender, some blanching after the feeding. Vasospasm after feeding. Possible clogged duct on left breast, directly under areola   Psychiatric: Normal mood and affect. Her behavior is normal. Judgment and thought content normal.  Mental Health: Did NOT exhibit sadness, crying, feeling overwhelmed, agitation or hypervigilance.    Assessment/Plan & Lactation Counseling:     Infant Exam on Infant Chart    Infant Weight History:    12/03/2023: 7# 5.8oz  12/15/2023: 7# 3oz (Ped)  12/19/2023: 7# 11oz (Ped)   01/03/2024: 8# 12.1oz  01/11/2024: 9# 13.8oz  01/16/2024: 10# 7.5oz  01/22/2024: 11# 2oz  01/30/2024: 12# 0oz    Infant Intake at Breast:   L  46mls  R  8mls   Total: 54mls  Milk Transfer at this feeding:   Effective breastfeeding     Pumped: Not indicated   Initiation of Feeding: Infant initiates  Attachment Achieved: rapidly  Nipple shield: N/A       Difficult Latch Due To:   Position and latch  Suck Pattern at the Breast: Suck burst and normal rest  Suck Pattern on the Bottle: Suck burst and normal rest and losing milk on the sides   Behavior Following Observed Feeding: content  Nipple Pain: Tenderness  Nipple Pain From:Contact forces of the tongue causing nipple strain resulting in damage     Latch: Mom latches independently and Assisted latch  Suckling/Feeding: attaches, baby fed effectively, baby roots, elicits KIMBERLYN, and rhythmic  Sucking strength: Moderate Strong  Sucking Rhythm Coordinated   Compression: WNL    Once latched, baby fell into a mature and fully integrated SSB pattern.    Swallowing No difficulty noted  If functional feeding, it is quiet, rhythmic, coordinated, organized, effeicent safe, satisfying and pleasurable for both parent and baby? Not yet  Milk Supply Available: abundant     MATERNAL PERSONALIZED BREASTFEEDING PLAN  (Babies and parents change and adapt  or mal-adapt, to each other, so a plan today is only as good as it facilitates the families goals, follow up is key in a dynamic process as breastfeeding.)  Discussed concerns and symptoms as listed above in assessment and guidance summarized below. Shared decision making was used between myself and the family for this encounter, home care, and follow up. Topics advised, taught and or reviewed included:   Feeding:   Infant feeding well given current interval growth, guidelines to follow:  Feed your baby every 1.5-3 hours, more often if baby acts hungry.   Awaken  baby for feeding if going over 3 hours in the day.    No need to wake Home for nighttime feedings.   Daily goal: 8-9 feedings per 24 hours.   Supplement:   No supplement is needed.  Offer replacement bottles as desired.   Pumping Guidelines:  Both breasts 1 times in 24 hours  Untouched breast for relief not to empty   Type of Pump:  Medela, Spectra, and Manual  How long will vary woman to woman, typically 8-15 minutes, or 1-2 minutes after flow slows  Flange Fit: Freely moving nipple in the tunnel with some movement of the areola.  Nipple care:    May apply breastmilk  Lanolin contraindicated with wool sensitivity  You do not need to wash this off before pumping or feeding the baby  You may want to remove baby from breast when active swallowing stops to avoid prolonged nipple compressionMaternal nipple Vasospasm  I would definitely try heat, although heat does not always work with vasospasm. When I have seen intense pain on one side like this, in the over producing breast.  Think vasospasm, there is research from the 1980’s that demonstrates vasoconstriction of the blood vessels when the alveoli are stretched. The authors describe this as an adaptation to decrease flow of nutrient substrates to the alveoli. In other words, the maternal capillaries around the alveoli narrow to decrease transfer of nutrients to the alveoli, in order to decrease milk production.   I see vasospasm as a secondary phenomenon, you have to identify the primary issue that is causing this microedema and treat that primary issue--the hyperlactation, the nipple injury to get rid of the primary cause of the vasospasm, while treating the vasospasm itself symptomatically-.  Start with heat and latch and we can do other things if not resolved.   Answers to FAQs: https://www.infantrisk.com/category/breastfeeding  Alcohol & Breastfeeding: What's your time-to-zero?  Cough & Cold Medications while Breastfeeding  Vitamin D Supplementation and  Breastfeeding  Antidepressant Use While Breastfeeding: What should I know?  Breastfeeding, Caffeine, and Energy Drinks  Recommended resources:  Physicians guide to breastfeeding, must up to date and accurate information available on breastfeeding. https://physicianguidetobreastfeeding.org/  Connect with other mothers:  Facebook:   [place] [facility]: https://www.[employer].com/[place].breastfeeds/  Well-Nourished Babies (Private group for questions and support): https://www.facebook.com/groups/737487014671730/  Breastfeeding [place] including opportunity to weight your baby and do before and after weights   Spoiler alert!  Parenting can be really hard. There is so much to learn when it comes to caring for small humans.  It can feel lonely and unsettling.  Our group, is a parenting and feeding support group that's all about feeding/growing tristan.   Babies welcome.   Questions expected.   We will help you find your village!    Tuesdays 10am - 11am. Women[facility] at [address] [place] [address], [address] [place] [address], 3rd floor conference room  Check your baby's weight, do a feeding and see how your baby is growing, visit with other mothers, plan on a walk or coffee date after group.  Please download Growth: Baby and Child for [employer] or Child Growth Tracker for [employer] if you would like to  document and follow your baby's growth curve.  Due to space limitations - limit strollers please (New c/section moms please use your stroller).  We would love to have dads stay, but moms won't breastfeed if there are men in the room, sorry.  The room is generally scheduled for another event following group.  Please take all diapers with you   ONLINE SUPPORT GROUPS  [employer] ([employer]) support groups are conducted using a uerl-qf-ycsm support model and are not intended for those experiencing a mental health crisis. Groups are 90 minutes (1.5 hours) in length. The first ~30 minutes is providing information, education, and establishing  group guidelines. The next ~60 minutes is “talk time,” in which group members share and talk with each other. Group members must be present for the group guidelines before joining in the discussion or “talk time.”     Follow up   Please call [phone] our voicemail line or the front office at [phone] to scheduled your next appointment.    A total of 60 minutes, including infant assessment time,  was spent preparing to see the patient, obtaining and reviewing separately obtained history, performing a medically appropriate examination and evaluation, counseling and educating the family, communicating with other health care professionals, documenting clinical information in the electronic health record, independently interpreting weighted feeds and infant growth results, communicating these results to the family and care coordination as detailed in the above note.       Anayeli Lubin

## 2025-06-29 ENCOUNTER — OFFICE VISIT (OUTPATIENT)
Dept: URGENT CARE | Facility: PHYSICIAN GROUP | Age: 35
End: 2025-06-29
Payer: COMMERCIAL

## 2025-06-29 VITALS
DIASTOLIC BLOOD PRESSURE: 68 MMHG | HEIGHT: 63 IN | RESPIRATION RATE: 16 BRPM | OXYGEN SATURATION: 97 % | SYSTOLIC BLOOD PRESSURE: 126 MMHG | HEART RATE: 95 BPM | WEIGHT: 175 LBS | TEMPERATURE: 98 F | BODY MASS INDEX: 31.01 KG/M2

## 2025-06-29 DIAGNOSIS — B96.89 ACUTE BACTERIAL SINUSITIS: Primary | ICD-10-CM

## 2025-06-29 DIAGNOSIS — J01.90 ACUTE BACTERIAL SINUSITIS: Primary | ICD-10-CM

## 2025-06-29 RX ORDER — FLUTICASONE PROPIONATE 50 MCG
2 SPRAY, SUSPENSION (ML) NASAL DAILY
Qty: 16 G | Refills: 0 | Status: SHIPPED | OUTPATIENT
Start: 2025-06-29

## 2025-06-29 ASSESSMENT — ENCOUNTER SYMPTOMS
EYE DISCHARGE: 1
SHORTNESS OF BREATH: 0
FEVER: 0
SPUTUM PRODUCTION: 1
SINUS PAIN: 1
NAUSEA: 0
COUGH: 1
MYALGIAS: 0
DIARRHEA: 0
CHILLS: 0
SORE THROAT: 0
ORTHOPNEA: 0
HEADACHES: 1
WHEEZING: 0

## 2025-06-29 ASSESSMENT — FIBROSIS 4 INDEX: FIB4 SCORE: 1.1

## 2025-06-29 NOTE — PROGRESS NOTES
Subjective     Thong Soto is a 35 y.o. female who presents with Eye Color Change (Both Eyes Pink , Coughing and Nasal Pressure, ear pain . Over a Week . )            HPI  New problem.  Patient is a very pleasant 35-year-old female presenting with a 8-day history of upper respiratory infections of nasal pain and pressure, cough, ear pain, and intermittent eye discharge.  She denies fever, chills, myalgia, nausea, or diarrhea.  She has not taken any medications as she is continuing to nurse her baby who is18 months old.    Patient has no known allergies.  Medications Ordered Prior to Encounter[1]  Social History     Socioeconomic History    Marital status: Single     Spouse name: Not on file    Number of children: Not on file    Years of education: Not on file    Highest education level: Not on file   Occupational History    Not on file   Tobacco Use    Smoking status: Never    Smokeless tobacco: Never   Vaping Use    Vaping status: Not on file   Substance and Sexual Activity    Alcohol use: Yes     Comment: occ    Drug use: No    Sexual activity: Yes     Partners: Male   Other Topics Concern    Not on file   Social History Narrative    Not on file     Social Drivers of Health     Financial Resource Strain: Not on file   Food Insecurity: Not on file   Transportation Needs: Not on file   Physical Activity: Not on file   Stress: Not on file   Social Connections: Not on file   Intimate Partner Violence: Not on file   Housing Stability: Not on file     History reviewed. No pertinent family history.      Review of Systems   Constitutional:  Positive for malaise/fatigue. Negative for chills and fever.   HENT:  Positive for congestion, ear pain and sinus pain. Negative for sore throat.    Eyes:  Positive for discharge.   Respiratory:  Positive for cough and sputum production. Negative for shortness of breath and wheezing.    Cardiovascular:  Negative for chest pain and orthopnea.   Gastrointestinal:  Negative for diarrhea  "and nausea.   Musculoskeletal:  Negative for myalgias.   Neurological:  Positive for headaches.   Endo/Heme/Allergies:  Negative for environmental allergies.   All other systems reviewed and are negative.             Objective     /68 (BP Location: Left arm, Patient Position: Sitting, BP Cuff Size: Adult)   Pulse 95   Temp 36.7 °C (98 °F) (Temporal)   Resp 16   Ht 1.6 m (5' 3\")   Wt 79.4 kg (175 lb)   SpO2 97%   BMI 31.00 kg/m²      Physical Exam  Vitals and nursing note reviewed.   Constitutional:       General: She is not in acute distress.     Appearance: She is well-developed.   HENT:      Head: Normocephalic.      Right Ear: Tympanic membrane and external ear normal.      Left Ear: Tympanic membrane and external ear normal.      Nose: Mucosal edema, congestion and rhinorrhea present.      Mouth/Throat:      Pharynx: No posterior oropharyngeal erythema.   Eyes:      General:         Right eye: No discharge.         Left eye: No discharge.      Conjunctiva/sclera: Conjunctivae normal.   Cardiovascular:      Rate and Rhythm: Normal rate and regular rhythm.      Heart sounds: Normal heart sounds.   Pulmonary:      Effort: Pulmonary effort is normal.      Breath sounds: Normal breath sounds.   Musculoskeletal:         General: Normal range of motion.      Cervical back: Normal range of motion and neck supple.   Lymphadenopathy:      Cervical: No cervical adenopathy.   Skin:     General: Skin is warm and dry.   Neurological:      Mental Status: She is alert and oriented to person, place, and time.   Psychiatric:         Behavior: Behavior normal.         Thought Content: Thought content normal.                                  Assessment & Plan  Acute bacterial sinusitis    Orders:    amoxicillin-clavulanate (AUGMENTIN) 875-125 MG Tab; Take 1 Tablet by mouth 2 times a day for 7 days.    fluticasone (FLONASE) 50 MCG/ACT nasal spray; Administer 2 Sprays into affected nostril(S) every day.     Push " fluids/rest.  Flonase/augmentin  Differential diagnosis, natural history, supportive care, and indications for immediate follow-up were discussed.                    [1]   Current Outpatient Medications on File Prior to Visit   Medication Sig Dispense Refill    ibuprofen (MOTRIN) 800 MG Tab Take 1 Tablet by mouth every 8 hours. 30 Tablet 1    docusate sodium 100 MG Cap Take 100 mg by mouth 2 times a day as needed for Constipation. (Patient not taking: Reported on 6/29/2025) 60 Capsule 1     No current facility-administered medications on file prior to visit.

## (undated) DEVICE — HEAD HOLDER JUNIOR/ADULT

## (undated) DEVICE — CANISTER SUCTION 3000ML MECHANICAL FILTER AUTO SHUTOFF MEDI-VAC NONSTERILE LF DISP  (40EA/CA)

## (undated) DEVICE — SUTURE 1 VICRYL PLUS CTX - 36 INCH (36/BX)

## (undated) DEVICE — WATER IRRIGATION STERILE 1000ML (12EA/CA)

## (undated) DEVICE — CATHETER IV NON-SAFETY 18 GA X 1 1/4 (50/BX 4BX/CA)

## (undated) DEVICE — SET EXTENSION WITH 2 PORTS (48EA/CA) ***PART #2C8610 IS A SUBSTITUTE*****

## (undated) DEVICE — GLOVE BIOGEL SZ 6 PF LATEX - (50EA/BX 4BX/CA)

## (undated) DEVICE — SODIUM CHL IRRIGATION 0.9% 1000ML (12EA/CA)

## (undated) DEVICE — STAPLER SKIN DISP - (6/BX 10BX/CA) VISISTAT

## (undated) DEVICE — CHLORAPREP 26 ML APPLICATOR - ORANGE TINT(25/CA)

## (undated) DEVICE — TAPE CLOTH MEDIPORE 6 INCH - (12RL/CA)

## (undated) DEVICE — TUBING CLEARLINK DUO-VENT - C-FLO (48EA/CA)

## (undated) DEVICE — TUBE SUCTION YANKAUER  1/4 X 6FT (20EA/CA)"

## (undated) DEVICE — KIT  I.V. START (100EA/CA)

## (undated) DEVICE — ELECTRODE DUAL RETURN W/ CORD - (50/PK)

## (undated) DEVICE — SLEEVE, SEQUENTIAL CALF REG

## (undated) DEVICE — TRAY SPINAL ANESTHESIA NON-SAFETY (10/CA)

## (undated) DEVICE — SUTURE 1 CHROMIC CTX ETHICON - (36PK/BX)

## (undated) DEVICE — PACK C-SECTION (2EA/CA)